# Patient Record
Sex: FEMALE | Race: WHITE
[De-identification: names, ages, dates, MRNs, and addresses within clinical notes are randomized per-mention and may not be internally consistent; named-entity substitution may affect disease eponyms.]

---

## 2017-01-13 ENCOUNTER — HOSPITAL ENCOUNTER (OUTPATIENT)
Dept: HOSPITAL 47 - LABWHC1 | Age: 62
Discharge: HOME | End: 2017-01-13
Payer: MEDICAID

## 2017-01-13 DIAGNOSIS — D64.9: Primary | ICD-10-CM

## 2017-01-13 LAB
ALP SERPL-CCNC: 91 U/L (ref 38–126)
ALT SERPL-CCNC: 30 U/L (ref 9–52)
ANION GAP SERPL CALC-SCNC: 12 MMOL/L
AST SERPL-CCNC: 20 U/L (ref 14–36)
BASOPHILS # BLD AUTO: 0 K/UL (ref 0–0.2)
BASOPHILS NFR BLD AUTO: 0 %
BUN SERPL-SCNC: 14 MG/DL (ref 7–17)
CALCIUM SPEC-MCNC: 9.7 MG/DL (ref 8.4–10.2)
CH: 20.2
CHCM: 28.4
CHLORIDE SERPL-SCNC: 106 MMOL/L (ref 98–107)
CO2 SERPL-SCNC: 26 MMOL/L (ref 22–30)
EOSINOPHIL # BLD AUTO: 0 K/UL (ref 0–0.7)
EOSINOPHIL NFR BLD AUTO: 0 %
ERYTHROCYTE [DISTWIDTH] IN BLOOD BY AUTOMATED COUNT: 3.55 M/UL (ref 3.8–5.4)
ERYTHROCYTE [DISTWIDTH] IN BLOOD: 16.8 % (ref 11.5–15.5)
GLUCOSE SERPL-MCNC: 89 MG/DL (ref 74–99)
HCT VFR BLD AUTO: 25.3 % (ref 34–46)
HDW: 3.98
HGB BLD-MCNC: 7.1 GM/DL (ref 11.4–16)
LUC NFR BLD AUTO: 2 %
LYMPHOCYTES # SPEC AUTO: 1.2 K/UL (ref 1–4.8)
LYMPHOCYTES NFR SPEC AUTO: 18 %
MCH RBC QN AUTO: 20 PG (ref 25–35)
MCHC RBC AUTO-ENTMCNC: 28.1 G/DL (ref 31–37)
MCV RBC AUTO: 71.3 FL (ref 80–100)
MONOCYTES # BLD AUTO: 0.5 K/UL (ref 0–1)
MONOCYTES NFR BLD AUTO: 7 %
NEUTROPHILS # BLD AUTO: 4.6 K/UL (ref 1.3–7.7)
NEUTROPHILS NFR BLD AUTO: 72 %
NON-AFRICAN AMERICAN GFR(MDRD): >60
POTASSIUM SERPL-SCNC: 4.7 MMOL/L (ref 3.5–5.1)
PROT SERPL-MCNC: 7.5 G/DL (ref 6.3–8.2)
SODIUM SERPL-SCNC: 144 MMOL/L (ref 137–145)
TIBC SERPL-MCNC: 439 UG/DL (ref 265–497)
VIT B12 SERPL-MCNC: 887 PG/ML (ref 239–931)
WBC # BLD AUTO: 0.11 10*3/UL
WBC # BLD AUTO: 6.4 K/UL (ref 3.8–10.6)
WBC (PEROX): 6.67

## 2017-01-13 PROCEDURE — 85652 RBC SED RATE AUTOMATED: CPT

## 2017-01-13 PROCEDURE — 84443 ASSAY THYROID STIM HORMONE: CPT

## 2017-01-13 PROCEDURE — 82746 ASSAY OF FOLIC ACID SERUM: CPT

## 2017-01-13 PROCEDURE — 85025 COMPLETE CBC W/AUTO DIFF WBC: CPT

## 2017-01-13 PROCEDURE — 82607 VITAMIN B-12: CPT

## 2017-01-13 PROCEDURE — 80053 COMPREHEN METABOLIC PANEL: CPT

## 2017-01-13 PROCEDURE — 82728 ASSAY OF FERRITIN: CPT

## 2017-01-13 PROCEDURE — 83550 IRON BINDING TEST: CPT

## 2017-01-13 PROCEDURE — 36415 COLL VENOUS BLD VENIPUNCTURE: CPT

## 2017-02-14 NOTE — P.GSHP
History of Present Illness


H&P Date: 02/15/17


Chief Complaint: Anemia





Patient was scheduled for upper and lower endoscopy tomorrow for the diagnosis 

of anemia.  Her last colonoscopy was in 2013.  That was normal with the 

exception of diverticulosis.  To my knowledge is not having any bowel related 

complaints at this time.  No family history of colon cancer.





Past Medical History


Past Medical History: Blood Disorder, GERD/Reflux


Additional Past Medical History / Comment(s): Hx Anemia. Hx of phlebitis LLE in 

1980.Shingles 11/16. Fractured 6th rib and fractured L2-T9.


History of Any Multi-Drug Resistant Organisms: None Reported


Past Surgical History: Appendectomy, Bladder Surgery, Ear Surgery, Hysterectomy


Additional Past Surgical History / Comment(s): Hx colonoscopy, bladder susp. 2 

ear surgeries (anurag).


Past Anesthesia/Blood Transfusion Reactions: Motion Sickness, Postoperative 

Nausea & Vomiting (PONV)


Additional Past Anesthesia/Blood Transfusion Reaction / Comment(s): Prior 

transfusions of her own blood.


Past Psychological History: No Psychological Hx Reported


Smoking Status: Never smoker


Past Alcohol Use History: Rare


Past Drug Use History: None Reported





- Past Family History


  ** Brother(s)


Family Medical History: Cancer


Additional Family Medical History / Comment(s): Hodgkins.





Medications and Allergies


 Home Medications











 Medication  Instructions  Recorded  Confirmed  Type


 


Ascorbic Acid [Vitamin C] 1,000 mg PO DAILY 02/10/17 02/10/17 History


 


Coral Calcium 1 tab PO DAILY 02/10/17 02/10/17 History


 


Ferrous Sulfate [Feosol] 325 mg PO BID 02/10/17 02/10/17 History


 


Ranitidine HCl [Zantac] 150 mg PO HS 02/10/17 02/10/17 History


 


Vitamin B Complex 1 tab PO DAILY 02/10/17 02/10/17 History


 


Vitamin D3 Unk 5,000 mg PO DAILY 02/10/17 02/10/17 History











 Allergies











Allergy/AdvReac Type Severity Reaction Status Date / Time


 


nitrofurantoin Allergy  Rash/Hives Verified 02/10/17 12:48





[From Macrodantin]     


 


Penicillins Allergy  Itching Verified 02/10/17 12:48














Surgical - Exam





Deferred





Assessment and Plan


(1) Anemia


Narrative/Plan: 


Will proceed with upper and lower endoscopy.


Status: Acute

## 2017-02-15 ENCOUNTER — HOSPITAL ENCOUNTER (OUTPATIENT)
Dept: HOSPITAL 47 - ORWHC2ENDO | Age: 62
Discharge: HOME | End: 2017-02-15
Payer: MEDICAID

## 2017-02-15 VITALS — DIASTOLIC BLOOD PRESSURE: 84 MMHG | SYSTOLIC BLOOD PRESSURE: 146 MMHG | HEART RATE: 63 BPM

## 2017-02-15 VITALS — RESPIRATION RATE: 16 BRPM | TEMPERATURE: 98.3 F

## 2017-02-15 VITALS — BODY MASS INDEX: 32.5 KG/M2

## 2017-02-15 DIAGNOSIS — Z88.0: ICD-10-CM

## 2017-02-15 DIAGNOSIS — Z88.1: ICD-10-CM

## 2017-02-15 DIAGNOSIS — K44.9: ICD-10-CM

## 2017-02-15 DIAGNOSIS — D12.5: ICD-10-CM

## 2017-02-15 DIAGNOSIS — Z80.7: ICD-10-CM

## 2017-02-15 DIAGNOSIS — Z79.899: ICD-10-CM

## 2017-02-15 DIAGNOSIS — K22.2: ICD-10-CM

## 2017-02-15 DIAGNOSIS — D12.3: ICD-10-CM

## 2017-02-15 DIAGNOSIS — K29.50: ICD-10-CM

## 2017-02-15 DIAGNOSIS — K20.0: ICD-10-CM

## 2017-02-15 DIAGNOSIS — K29.80: Primary | ICD-10-CM

## 2017-02-15 DIAGNOSIS — K21.0: ICD-10-CM

## 2017-02-15 PROCEDURE — 99153 MOD SED SAME PHYS/QHP EA: CPT

## 2017-02-15 PROCEDURE — 88312 SPECIAL STAINS GROUP 1: CPT

## 2017-02-15 PROCEDURE — 43239 EGD BIOPSY SINGLE/MULTIPLE: CPT

## 2017-02-15 PROCEDURE — 88342 IMHCHEM/IMCYTCHM 1ST ANTB: CPT

## 2017-02-15 PROCEDURE — 45385 COLONOSCOPY W/LESION REMOVAL: CPT

## 2017-02-15 PROCEDURE — 88305 TISSUE EXAM BY PATHOLOGIST: CPT

## 2017-02-15 NOTE — P.HPADDEND
H&P Addendum


H&P Addendum Date: 02/15/17








Physical exam:


General: Well-developed, well-nourished


HEENT: Normocephalic, sclerae nonicteric


Abdomen: Nontender, nondistended


Extremities: No edema


Neuro: Alert and orient





No additional changes to the H&P

## 2017-02-15 NOTE — P.PCN
Date of Procedure: 02/15/17


Procedure(s) Performed: 


PREOPERATIVE DIAGNOSIS: Anemia


POSTOPERATIVE DIAGNOSIS: Duodenitis with erosions, gastritis, moderate hiatal 

hernia, distal esophagitis with Schatzki's ring, transverse and sigmoid colon 

polyps


PROCEDURE: 1.  EGD with biopsy 2.  Colonoscopy with snare polypectomy


ANESTHESIA: MAC


SURGEON: Aubrey Edmondson M.D.


SPECIMENS: Duodenum, antrum, GE junction, polyps


ENDOSCOPIC PROCEDURE: The patient was on the endoscopy table in the left 

decubitus position.  The Olympus gastroscope was inserted into the oropharynx 

and passed under direct visualization to the region of the third portion of the 

duodenum.  From that point the scope was slowly withdrawn inspecting all 

surfaces carefully.  There was duodenitis present with multiple small 

superficial erosions.  Biopsies of the duodenum took place.  The pylorus was 

widely patent.  The stomach revealed mild gastritis.  Retroflexion revealed a 

moderate sized hiatal hernia.  The diaphragmatic hiatus was present at 44 7 m.  

The GE junction was present at 39 cm.  There was some gastritis at the site of 

the hernia is well.  The esophagus was examined.  There was distal esophagitis 

present with a Schatzki's ring noted.  A biopsy of the GE junction took place.  

The remainder the esophagus appear normal. 


     The patient was kept on the endoscopy table in the left decubitus 

position.  The Olympus colonoscope was inserted into the anus and passed under 

direct visualization to the base of the cecum.  The appendiceal orifice was 

visualized.  From that point the scope was slowly withdrawn inspecting all 

surfaces carefully.  There were no neoplastic inflammatory or polypoid lesions 

throughout the cecum and ascending colon.  In the transverse colon there was 

noted to be a small polyp that was removed using the snare with cautery 

technique.  In the sigmoid colon and another small polyp was removed in a 

similar fashion.  The patient's prep was somewhat suboptimal particularly in 

the rectosigmoid region.  I did not visualize any abnormalities however.  There 

was no visible diverticulosis seen.  Digital rectal examination was normal.  

The patient was taken to the recovery room in stable condition per anesthesia 

guidelines.


RECOMMENDATIONS: Biopsy results.  Increase antiacid therapy.  Minimize NSAID 

use.

## 2019-10-11 ENCOUNTER — HOSPITAL ENCOUNTER (OUTPATIENT)
Dept: HOSPITAL 47 - LABPAT | Age: 64
Discharge: HOME | End: 2019-10-11
Attending: INTERNAL MEDICINE
Payer: MEDICARE

## 2019-10-11 DIAGNOSIS — R94.39: ICD-10-CM

## 2019-10-11 DIAGNOSIS — R06.02: ICD-10-CM

## 2019-10-11 DIAGNOSIS — Z01.812: Primary | ICD-10-CM

## 2019-10-11 LAB
ANION GAP SERPL CALC-SCNC: 10 MMOL/L
BUN SERPL-SCNC: 17 MG/DL (ref 7–17)
CHLORIDE SERPL-SCNC: 103 MMOL/L (ref 98–107)
CO2 SERPL-SCNC: 28 MMOL/L (ref 22–30)
ERYTHROCYTE [DISTWIDTH] IN BLOOD BY AUTOMATED COUNT: 4.31 M/UL (ref 3.8–5.4)
ERYTHROCYTE [DISTWIDTH] IN BLOOD: 12.6 % (ref 11.5–15.5)
GLUCOSE SERPL-MCNC: 94 MG/DL (ref 74–99)
HCT VFR BLD AUTO: 41.9 % (ref 34–46)
HGB BLD-MCNC: 14.3 GM/DL (ref 11.4–16)
MCH RBC QN AUTO: 33.3 PG (ref 25–35)
MCHC RBC AUTO-ENTMCNC: 34.2 G/DL (ref 31–37)
MCV RBC AUTO: 97.2 FL (ref 80–100)
PLATELET # BLD AUTO: 304 K/UL (ref 150–450)
POTASSIUM SERPL-SCNC: 4.3 MMOL/L (ref 3.5–5.1)
SODIUM SERPL-SCNC: 141 MMOL/L (ref 137–145)
WBC # BLD AUTO: 8.2 K/UL (ref 3.8–10.6)

## 2019-10-11 PROCEDURE — 84520 ASSAY OF UREA NITROGEN: CPT

## 2019-10-11 PROCEDURE — 82565 ASSAY OF CREATININE: CPT

## 2019-10-11 PROCEDURE — 85027 COMPLETE CBC AUTOMATED: CPT

## 2019-10-11 PROCEDURE — 82947 ASSAY GLUCOSE BLOOD QUANT: CPT

## 2019-10-11 PROCEDURE — 36415 COLL VENOUS BLD VENIPUNCTURE: CPT

## 2019-10-11 PROCEDURE — 80051 ELECTROLYTE PANEL: CPT

## 2019-10-15 ENCOUNTER — HOSPITAL ENCOUNTER (OUTPATIENT)
Dept: HOSPITAL 47 - CATHCVL | Age: 64
End: 2019-10-15
Attending: INTERNAL MEDICINE
Payer: MEDICARE

## 2019-10-15 VITALS — TEMPERATURE: 98.1 F | RESPIRATION RATE: 18 BRPM

## 2019-10-15 VITALS — SYSTOLIC BLOOD PRESSURE: 128 MMHG | HEART RATE: 55 BPM | DIASTOLIC BLOOD PRESSURE: 65 MMHG

## 2019-10-15 VITALS — BODY MASS INDEX: 36.7 KG/M2

## 2019-10-15 DIAGNOSIS — I07.1: ICD-10-CM

## 2019-10-15 DIAGNOSIS — E78.00: ICD-10-CM

## 2019-10-15 DIAGNOSIS — R93.1: ICD-10-CM

## 2019-10-15 DIAGNOSIS — I51.7: Primary | ICD-10-CM

## 2019-10-15 DIAGNOSIS — I77.1: ICD-10-CM

## 2019-10-15 DIAGNOSIS — R94.39: ICD-10-CM

## 2019-10-15 DIAGNOSIS — E78.5: ICD-10-CM

## 2019-10-15 DIAGNOSIS — R07.89: ICD-10-CM

## 2019-10-15 DIAGNOSIS — Z88.1: ICD-10-CM

## 2019-10-15 DIAGNOSIS — Z79.899: ICD-10-CM

## 2019-10-15 PROCEDURE — 93306 TTE W/DOPPLER COMPLETE: CPT

## 2019-10-15 PROCEDURE — 93454 CORONARY ARTERY ANGIO S&I: CPT

## 2019-10-15 RX ADMIN — VERAPAMIL HYDROCHLORIDE ONE ML: 2.5 INJECTION, SOLUTION INTRAVENOUS at 06:25

## 2019-10-15 RX ADMIN — VERAPAMIL HYDROCHLORIDE ONE ML: 2.5 INJECTION, SOLUTION INTRAVENOUS at 06:09

## 2019-10-15 NOTE — CC
CARDIAC CATHETERIZATION REPORT



DATE OF SERVICE:

10/15/2019



PROCEDURE:

Coronary angiography.



PERFORMED BY:

Dr. NORTH Farris.



CLINICAL INFORMATION:

Mrs. Michaela Wilkins is a 64-year-old lady with hypertension, hyperlipidemia,
who

had a recent positive stress test with poor exercise capacity.  She was advised

coronary angiography after due discussion regarding risks, benefits, and 
options.



PROCEDURE NOTE:

Under local anesthesia and strict aseptic precautions, a 6-[place] introducer was
placed

in the right radial artery.  I used a JL3.5 and JR4 catheters, There was extreme

tortuosity as the brachiocephalic artery connected to the ascending aorta.  
However,

with the help of a Glidewire, I was able to get the catheter down and using the 
wire to

help cannulate  both arteries were cannulated nicely.  I did not attempt LV 
pressures

because of extreme tortuosity of the arterial system.

Coronary angiography was performed uneventfully.  The sheath was taken out and 
TR band

applied and patient was sent to the room in stable condition.  Saturation of the

fingers of the right hand was about 98%.  Patient received 3,500 units of 
heparin

intravenously.



CORONARY ANGIOGRAPHY FINDINGS:

RIGHT CORONARY ARTERY: Technically a dominant vessel.  No significant disease. 
Distally

it bifurcates into PDA and PLV, both of which supply a sizable amount of 
myocardium.

There is no significant disease in the dominant RCA or its branches.



LEFT MAIN CORONARY ARTERY: Short patent disease-free vessel that bifurcates into
LAD

and circumflex.



LEFT ANTERIOR DESCENDING CORONARY ARTERY: Good caliber vessel extends along the

anterior wall, gives off septal and diagonal branches runs all the way to the 
apex, has

no significant disease only minor irregularities noted.



LEFT POSTERIOR CIRCUMFLEX CORONARY ARTERY: Technically nondominant vessel gives 
off a

single obtuse marginal that runs laterally in the lateral direction with a 
tortuous

larger branch which continues as a _postero- lateral.  No significant disease in
the

nondominant fair caliber, fair distribution circumflex system.



LV-gram was not performed.  LV pressures were not checked.



FINAL IMPRESSION:

This patient has a right dominant system.  No significant obstructive coronary 
artery

disease.  Filling pressures were not checked.



RECOMMENDATIONS:

Findings were reviewed with the patient.  She has a poor exercise capacity.  We 
will

also check pulmonary function tests and seek full pulmonary evaluation and get 
into a

cardiac rehab type program to improve her conditioning.  The patient will be 
discharged

later on today and will be seen in my office in the next 48 hours.



Moderate conscious sedation time was 26 min. 





MMODL / IJN: [id number] / Job#: [id number]

MTDLORENA

## 2019-10-15 NOTE — ECHOF
Referral Reason:LV Function



MEASUREMENTS

--------

HEIGHT: 160.0 cm

WEIGHT: 112.9 kg

BP: 

RVIDd:   3.4 cm     (< 3.3)

IVSd:   1.3 cm     (0.6 - 1.1)

LVIDd:   4.7 cm     (3.9 - 5.3)

LVPWd:   1.6 cm     (0.6 - 1.1)

IVSs:   1.9 cm

LVIDs:   3.2 cm

LVPWs:   1.8 cm

IVSd:   1.4 cm     (0.6 - 1.1)

LVIDd:   4.4 cm     (3.9 - 5.3)

LVPWd:   1.7 cm     (0.6 - 1.1)

IVSs:   2.4 cm

LVIDs:   2.3 cm

LVPWs:   2.2 cm

EDV(Teich):   89 ml

ESV(Teich):   19 ml

EF(Teich):   79 %

%FS:   48 %

SV(Teich):   70 ml

Ao Diam:   3.8 cm     (2.0 - 3.7)

AV Cusp:   1.7 cm     (1.5 - 2.6)

LA Diam:   3.6 cm     (2.7 - 3.8)

MV E Sergey:   0.60 m/s

MV DecT:   196 ms

MV A Sergey:   0.81 m/s

MV E/A Ratio:   0.74 







FINDINGS

--------

Sinus rhythm.

This was a technically adequate study.

The left ventricular size is normal.   There is moderate concentric left ventricular hypertrophy.   O
verall left ventricular systolic function is normal with, an EF between 55 - 60 %.   Atypical septal 
wall motion

The right ventricle is mildly enlarged.

The left atrial size is normal.

The right atrial size is normal.

Interatrial and interventricular septum intact.

There is no evidence of aortic regurgitation.   There is no evidence of aortic stenosis.

No mitral regurgitation.

Trace tricuspid regurgitation present.   Unable to estimate RVSP due to inadequate TR jet spectral do
ppler profile.

There is no pulmonic regurgitation present.

The aortic root size is normal.

IVC Not well visulized.

There is no pericardial effusion.



CONCLUSIONS

--------

1. Sinus rhythm.

2. This was a technically adequate study.

3. The left ventricular size is normal.

4. There is moderate concentric left ventricular hypertrophy.

5. Overall left ventricular systolic function is normal with, an EF between 55 - 60 %.

6. Atypical septal wall motion

7. The right ventricle is mildly enlarged.

8. The left atrial size is normal.

9. The right atrial size is normal.

10. Interatrial and interventricular septum intact.

11. There is no evidence of aortic regurgitation.

12. There is no evidence of aortic stenosis.

13. No mitral regurgitation.

14. Trace tricuspid regurgitation present.

15. Unable to estimate RVSP due to inadequate TR jet spectral doppler profile.

16. There is no pulmonic regurgitation present.

17. The aortic root size is normal.

18. IVC Not well visulized.

19. There is no pericardial effusion.





SONOGRAPHER: Sigrid eFrmin RDCS

## 2020-03-16 ENCOUNTER — HOSPITAL ENCOUNTER (OUTPATIENT)
Age: 65
Discharge: HOME | End: 2020-03-16
Payer: MEDICARE

## 2020-03-16 DIAGNOSIS — E78.5: Primary | ICD-10-CM

## 2020-03-16 PROCEDURE — 84460 ALANINE AMINO (ALT) (SGPT): CPT

## 2020-03-16 PROCEDURE — 82550 ASSAY OF CK (CPK): CPT

## 2020-03-16 PROCEDURE — 36415 COLL VENOUS BLD VENIPUNCTURE: CPT

## 2020-03-16 PROCEDURE — 84450 TRANSFERASE (AST) (SGOT): CPT

## 2020-03-16 PROCEDURE — 80061 LIPID PANEL: CPT

## 2022-05-26 ENCOUNTER — HOSPITAL ENCOUNTER (EMERGENCY)
Dept: HOSPITAL 47 - EC | Age: 67
LOS: 1 days | Discharge: HOME | End: 2022-05-27
Payer: MEDICARE

## 2022-05-26 DIAGNOSIS — M54.50: Primary | ICD-10-CM

## 2022-05-26 DIAGNOSIS — K21.9: ICD-10-CM

## 2022-05-26 DIAGNOSIS — Z79.899: ICD-10-CM
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
## 2022-05-26 PROCEDURE — 99283 EMERGENCY DEPT VISIT LOW MDM: CPT

## 2022-05-26 PROCEDURE — 96372 THER/PROPH/DIAG INJ SC/IM: CPT

## 2022-05-26 PROCEDURE — 96374 THER/PROPH/DIAG INJ IV PUSH: CPT

## 2022-05-26 NOTE — ED
General Adult HPI





- General


Chief complaint: Back Pain/Injury


Stated complaint: Pain Control


Time Seen by Provider: 05/26/22 21:32


Source: patient


Mode of arrival: wheelchair


Limitations: no limitations





- History of Present Illness


Initial comments: 


Patient is a 66-year-old female presenting with chief complaint of back pain.  

Patient states that at the end of April she sustained several fractures to the 

right arm and compression fractures to the lower back after falling backwards 

down 2 stairs.  Her hospital stay and surgeries were performed in [place].  

Patient is now back in [place] and following with Dr. Henderson for her right arm 

injuries.  Patient states that her pain has been increased recently.  She is 

taking Percocet 7.5 mg at home which have not been helping her pain today.  

Patient states that she has had long-standing prior difficulty controlling her 

bladder, denies any new or worsening loss of bladder control, denies loss of 

bowel control.  Denies saddle paresthesia, denies leg numbness or weakness.  

Denies chest pain, shortness of breath, abdominal pain, nausea, vomiting, 

hematochezia, hematuria, dysuria, urgency, frequency.








- Related Data


                                Home Medications











 Medication  Instructions  Recorded  Confirmed


 


Ascorbic Acid [Vitamin C] 1,000 mg PO DAILY 02/10/17 10/15/19


 


Vitamin B Complex 1 tab PO DAILY 02/10/17 10/15/19


 


Aspirin [Adult Low Dose Aspirin EC] 81 mg PO QAM 10/14/19 10/15/19


 


Cholecalciferol (Vitamin D3) 5,000 unit PO DAILY 10/14/19 10/15/19





[Vitamin D3]   


 


Ferrous Sulfate [Iron (65  mg PO DAILY 10/14/19 10/15/19





Elemental)]   


 


Metoprolol Tartrate [Lopressor] 25 mg PO QAM 10/14/19 10/15/19


 


Rosuvastatin Calcium [Crestor] 5 mg PO HS 10/14/19 10/15/19








                                  Previous Rx's











 Medication  Instructions  Recorded


 


amLODIPine BESYLATE [Norvasc] 5 mg PO HS #30 tablet 10/15/19











                                    Allergies











Allergy/AdvReac Type Severity Reaction Status Date / Time


 


nitrofurantoin Allergy  Rash/Hives Verified 10/15/19 05:17





[From Macrodantin]     


 


Penicillins Allergy  Itching Verified 10/15/19 05:17














Review of Systems


ROS Statement: 


Those systems with pertinent positive or pertinent negative responses have been 

documented in the HPI.





ROS Other: All systems not noted in ROS Statement are negative.





Past Medical History


Past Medical History: Blood Disorder, GERD/Reflux


Additional Past Medical History / Comment(s): Hx Anemia. Hx of phlebitis LLE in 

1980.Shingles 11/16. Fractured 6th rib and fractured L2-T9.


History of Any Multi-Drug Resistant Organisms: None Reported


Past Surgical History: Appendectomy, Bladder Surgery, Ear Surgery, Hysterectomy


Additional Past Surgical History / Comment(s): Hx colonoscopy, bladder susp. 2 

ear surgeries (anurag).


Past Anesthesia/Blood Transfusion Reactions: Motion Sickness, Postoperative 

Nausea & Vomiting (PONV)


Additional Past Anesthesia/Blood Transfusion Reaction / Comment(s): Prior 

transfusions of her own blood.


Past Psychological History: No Psychological Hx Reported


Smoking Status: Never smoker


Past Alcohol Use History: Rare


Past Drug Use History: None Reported





- Past Family History


  ** Brother(s)


Family Medical History: Cancer


Additional Family Medical History / Comment(s): Hodgkins.





General Exam


Limitations: no limitations


General appearance: alert, in no apparent distress


Head exam: Present: atraumatic, normocephalic, normal inspection


Eye exam: Present: normal appearance, EOMI.  Absent: scleral icterus


Neck exam: Present: normal inspection


Respiratory exam: Present: normal lung sounds bilaterally.  Absent: respiratory 

distress, wheezes, rales, rhonchi, stridor


Cardiovascular Exam: Present: regular rate, normal rhythm, normal heart sounds. 

Absent: systolic murmur, diastolic murmur, rubs, gallop, clicks


Extremities exam: Present: normal inspection (Patient is wearing a brace and has

several surgical scars to the right arm, otherwise normal inspection), full ROM 

(Limited range of motion of the right arm due to recent surgery, otherwise full 

range of motion).  Absent: tenderness


Back exam: Present: normal inspection.  Absent: full ROM (Secondary to pain)


Neurological exam: Present: alert, oriented X3, CN II-XII intact


  ** Expanded


Patient oriented to: Present: person, place, time


Speech: Present: fluid speech


Cranial nerves: EOM's Intact: Normal, Facial Sensation: Normal


Eye Response: (4) open spontaneously


Motor Response: (6) obeys commands


Verbal Response: (5) oriented


[place] Total: 15


Psychiatric exam: Present: normal affect, normal mood


Skin exam: Present: warm, dry, intact, normal color.  Absent: rash





Course


                                   Vital Signs











  05/26/22





  17:11


 


Temperature 98.2 F


 


Pulse Rate 74


 


Respiratory 18





Rate 


 


Blood Pressure 136/85


 


O2 Sat by Pulse 98





Oximetry 














Medical Decision Making





- Medical Decision Making


Patient is a 66-year-old female presenting with chief complaint of back pain.  

Patient states that at the end of April she suffered several compression 

fractures after falling backwards down 2 stairs.  Patient has been using her 

pain medication at home, this includes Percocet 7.5 mg, and states that today it

has not been helping her.  On examination patient is clearly in pain, other than

her right arm which is in a brace and has surgical changes, she has full range 

of motion of the remaining 3 limbs.  She denies any new onset loss of bowel or 

bladder control, saddle paresthesia, leg numbness or weakness.  Examination 

shows no focal neurological deficits.  Patient was given pain medication.  On r

eassessment patient is resting comfortably, she appears stable for discharge 

with outpatient follow-up at this time.  I instructed the patient to follow up 

with her PCP and orthopedist as soon as possible.  Report back to ER if any 

worsening symptoms.  I discussed return parameters and alarming symptoms.  

Answered all questions.  Patient conveyed verbal understanding and agreed to the

plan.  I discussed this case with my attending Dr. Thakkar.








Disposition


Clinical Impression: 


 Back pain





Disposition: HOME SELF-CARE


Condition: Good


Instructions (If sedation given, give patient instructions):  Acute Low Back 

Pain (ED)


Additional Instructions: 


Follow-up with your PCP as soon as possible.  Report back to ER if any worsening

symptoms, including but not limited to numbness of the genitals, loss of control

of your bowel or bladder, leg weakness.


Is patient prescribed a controlled substance at d/c from ED?: No


Referrals: 


Rafael Portillo DO [Primary Care Provider] - 1-2 days


Time of Disposition: 23:36

## 2022-05-27 VITALS
SYSTOLIC BLOOD PRESSURE: 143 MMHG | TEMPERATURE: 98.1 F | RESPIRATION RATE: 16 BRPM | HEART RATE: 71 BPM | DIASTOLIC BLOOD PRESSURE: 73 MMHG

## 2022-05-29 ENCOUNTER — HOSPITAL ENCOUNTER (INPATIENT)
Dept: HOSPITAL 47 - EC | Age: 67
LOS: 8 days | Discharge: HOME | DRG: 559 | End: 2022-06-06
Attending: FAMILY MEDICINE | Admitting: FAMILY MEDICINE
Payer: MEDICARE

## 2022-05-29 DIAGNOSIS — S32.058G: ICD-10-CM

## 2022-05-29 DIAGNOSIS — S32.028G: ICD-10-CM

## 2022-05-29 DIAGNOSIS — Z90.49: ICD-10-CM

## 2022-05-29 DIAGNOSIS — S32.010G: Primary | ICD-10-CM

## 2022-05-29 DIAGNOSIS — F41.9: ICD-10-CM

## 2022-05-29 DIAGNOSIS — S32.038G: ICD-10-CM

## 2022-05-29 DIAGNOSIS — S22.060G: ICD-10-CM

## 2022-05-29 DIAGNOSIS — Z87.11: ICD-10-CM

## 2022-05-29 DIAGNOSIS — I26.99: ICD-10-CM

## 2022-05-29 DIAGNOSIS — T46.0X5A: ICD-10-CM

## 2022-05-29 DIAGNOSIS — R32: ICD-10-CM

## 2022-05-29 DIAGNOSIS — Z79.01: ICD-10-CM

## 2022-05-29 DIAGNOSIS — D64.9: ICD-10-CM

## 2022-05-29 DIAGNOSIS — Z86.2: ICD-10-CM

## 2022-05-29 DIAGNOSIS — M85.88: ICD-10-CM

## 2022-05-29 DIAGNOSIS — Z87.81: ICD-10-CM

## 2022-05-29 DIAGNOSIS — R42: ICD-10-CM

## 2022-05-29 DIAGNOSIS — S22.080G: ICD-10-CM

## 2022-05-29 DIAGNOSIS — E66.9: ICD-10-CM

## 2022-05-29 DIAGNOSIS — S32.048G: ICD-10-CM

## 2022-05-29 DIAGNOSIS — Z88.1: ICD-10-CM

## 2022-05-29 DIAGNOSIS — M81.0: ICD-10-CM

## 2022-05-29 DIAGNOSIS — G89.29: ICD-10-CM

## 2022-05-29 DIAGNOSIS — Z81.8: ICD-10-CM

## 2022-05-29 DIAGNOSIS — Z86.19: ICD-10-CM

## 2022-05-29 DIAGNOSIS — Z91.81: ICD-10-CM

## 2022-05-29 DIAGNOSIS — Z79.899: ICD-10-CM

## 2022-05-29 DIAGNOSIS — Z80.7: ICD-10-CM

## 2022-05-29 DIAGNOSIS — K21.9: ICD-10-CM

## 2022-05-29 DIAGNOSIS — S22.070G: ICD-10-CM

## 2022-05-29 DIAGNOSIS — Z90.710: ICD-10-CM

## 2022-05-29 DIAGNOSIS — E78.5: ICD-10-CM

## 2022-05-29 DIAGNOSIS — Z86.72: ICD-10-CM

## 2022-05-29 DIAGNOSIS — M40.204: ICD-10-CM

## 2022-05-29 DIAGNOSIS — F41.0: ICD-10-CM

## 2022-05-29 DIAGNOSIS — Z98.890: ICD-10-CM

## 2022-05-29 DIAGNOSIS — Z79.82: ICD-10-CM

## 2022-05-29 DIAGNOSIS — Z88.0: ICD-10-CM

## 2022-05-29 DIAGNOSIS — M54.59: ICD-10-CM

## 2022-05-29 DIAGNOSIS — Z28.310: ICD-10-CM

## 2022-05-29 LAB
ALBUMIN SERPL-MCNC: 4.1 G/DL (ref 3.5–5)
ALP SERPL-CCNC: 212 U/L (ref 38–126)
ALT SERPL-CCNC: 21 U/L (ref 4–34)
ANION GAP SERPL CALC-SCNC: 9 MMOL/L
AST SERPL-CCNC: 28 U/L (ref 14–36)
BASOPHILS # BLD AUTO: 0 K/UL (ref 0–0.2)
BASOPHILS NFR BLD AUTO: 0 %
BUN SERPL-SCNC: 6 MG/DL (ref 7–17)
CALCIUM SPEC-MCNC: 9.5 MG/DL (ref 8.4–10.2)
CHLORIDE SERPL-SCNC: 103 MMOL/L (ref 98–107)
CO2 SERPL-SCNC: 23 MMOL/L (ref 22–30)
EOSINOPHIL # BLD AUTO: 0.1 K/UL (ref 0–0.7)
EOSINOPHIL NFR BLD AUTO: 1 %
ERYTHROCYTE [DISTWIDTH] IN BLOOD BY AUTOMATED COUNT: 4 M/UL (ref 3.8–5.4)
ERYTHROCYTE [DISTWIDTH] IN BLOOD: 15 % (ref 11.5–15.5)
GLUCOSE SERPL-MCNC: 92 MG/DL (ref 74–99)
HCT VFR BLD AUTO: 39 % (ref 34–46)
HGB BLD-MCNC: 12.3 GM/DL (ref 11.4–16)
LYMPHOCYTES # SPEC AUTO: 1.1 K/UL (ref 1–4.8)
LYMPHOCYTES NFR SPEC AUTO: 14 %
MCH RBC QN AUTO: 30.7 PG (ref 25–35)
MCHC RBC AUTO-ENTMCNC: 31.5 G/DL (ref 31–37)
MCV RBC AUTO: 97.5 FL (ref 80–100)
MONOCYTES # BLD AUTO: 0.6 K/UL (ref 0–1)
MONOCYTES NFR BLD AUTO: 7 %
NEUTROPHILS # BLD AUTO: 6.2 K/UL (ref 1.3–7.7)
NEUTROPHILS NFR BLD AUTO: 76 %
PH UR: 5.5 [PH] (ref 5–8)
PLATELET # BLD AUTO: 377 K/UL (ref 150–450)
POTASSIUM SERPL-SCNC: 3.8 MMOL/L (ref 3.5–5.1)
PROT SERPL-MCNC: 7.7 G/DL (ref 6.3–8.2)
SODIUM SERPL-SCNC: 135 MMOL/L (ref 137–145)
SP GR UR: 1.01 (ref 1–1.03)
SQUAMOUS UR QL AUTO: 3 /HPF (ref 0–4)
UROBILINOGEN UR QL STRIP: <2 MG/DL (ref ?–2)
WBC # BLD AUTO: 8.1 K/UL (ref 3.8–10.6)
WBC # UR AUTO: 4 /HPF (ref 0–5)

## 2022-05-29 PROCEDURE — 85025 COMPLETE CBC W/AUTO DIFF WBC: CPT

## 2022-05-29 PROCEDURE — 72072 X-RAY EXAM THORAC SPINE 3VWS: CPT

## 2022-05-29 PROCEDURE — 80053 COMPREHEN METABOLIC PANEL: CPT

## 2022-05-29 PROCEDURE — 99284 EMERGENCY DEPT VISIT MOD MDM: CPT

## 2022-05-29 PROCEDURE — 86140 C-REACTIVE PROTEIN: CPT

## 2022-05-29 PROCEDURE — 72100 X-RAY EXAM L-S SPINE 2/3 VWS: CPT

## 2022-05-29 PROCEDURE — 93970 EXTREMITY STUDY: CPT

## 2022-05-29 PROCEDURE — 96374 THER/PROPH/DIAG INJ IV PUSH: CPT

## 2022-05-29 PROCEDURE — 80306 DRUG TEST PRSMV INSTRMNT: CPT

## 2022-05-29 PROCEDURE — 71045 X-RAY EXAM CHEST 1 VIEW: CPT

## 2022-05-29 PROCEDURE — 96375 TX/PRO/DX INJ NEW DRUG ADDON: CPT

## 2022-05-29 PROCEDURE — 81001 URINALYSIS AUTO W/SCOPE: CPT

## 2022-05-29 PROCEDURE — 80048 BASIC METABOLIC PNL TOTAL CA: CPT

## 2022-05-29 RX ADMIN — OXYCODONE HYDROCHLORIDE AND ACETAMINOPHEN PRN EACH: 7.5; 325 TABLET ORAL at 23:38

## 2022-05-29 RX ADMIN — MORPHINE SULFATE PRN MG: 2 INJECTION, SOLUTION INTRAMUSCULAR; INTRAVENOUS at 20:57

## 2022-05-29 RX ADMIN — APIXABAN SCH MG: 5 TABLET, FILM COATED ORAL at 19:41

## 2022-05-29 RX ADMIN — CEFAZOLIN STA MLS/HR: 330 INJECTION, POWDER, FOR SOLUTION INTRAMUSCULAR; INTRAVENOUS at 14:00

## 2022-05-29 RX ADMIN — CEFAZOLIN STA MLS/HR: 330 INJECTION, POWDER, FOR SOLUTION INTRAMUSCULAR; INTRAVENOUS at 18:23

## 2022-05-29 RX ADMIN — METOPROLOL TARTRATE SCH MG: 25 TABLET, FILM COATED ORAL at 19:42

## 2022-05-29 NOTE — XR
EXAMINATION TYPE: XR thoracic spine complete

 

DATE OF EXAM: 5/29/2022

 

COMPARISON: 10/9/2015

 

HISTORY: Back pain

 

TECHNIQUE: 3 views

 

FINDINGS: There is thoracic kyphotic deformity with anterior wedging of upper lumbar and lower thorac
ic vertebra up to 50%. There is L1 more severe wedging of 75%. There is osteopenia. There is T11 wedg
ing 50% and T9 wedging 50%. T8 shows 50% compression deformity as well. There is no paraspinal mass. 
There is evidence of infiltrate and atelectasis at both lung bases. There is hiatal hernia.

 

IMPRESSION: Multiple thoracic compression fractures in the lower thoracic spine which are mostly new 
compared to old exam.

## 2022-05-29 NOTE — ED
General Adult HPI





- General


Chief complaint: Back Pain/Injury


Stated complaint: anxiety


Time Seen by Provider: 05/29/22 13:11


Source: patient, RN notes reviewed


Mode of arrival: EMS


Limitations: no limitations





- History of Present Illness


Initial comments: 





Patient is a pleasant 66-year-old female presenting to the emergency department 

with anxiety and pain.  Patient is admitting to feeling very anxious and 

persistently stating help me how may help me.  Patient had a fall over a month 

ago with several arm fractures.  Patient did have 3 surgeries.  Patient also has

history of reported compression fractures of T9 and L2 which were found to be 

chronic.  Patient denies chronic lower back pain.  No new incontinence or 

retention of bowel or bladder.  No leg weakness or loss of sensation.  Patient 

states she was here recently and given medication.  Patient is on Percocet at 

home.  Patient has spoke with Dr. Portillo office as well as Dr. Henderson's office.  

Patient feels she needs to be admitted.





- Related Data


                                Home Medications











 Medication  Instructions  Recorded  Confirmed


 


Ascorbic Acid [Vitamin C] 1,000 mg PO DAILY 02/10/17 10/15/19


 


Vitamin B Complex 1 tab PO DAILY 02/10/17 10/15/19


 


Aspirin [Adult Low Dose Aspirin EC] 81 mg PO QAM 10/14/19 10/15/19


 


Cholecalciferol (Vitamin D3) 5,000 unit PO DAILY 10/14/19 10/15/19





[Vitamin D3]   


 


Ferrous Sulfate [Iron (65  mg PO DAILY 10/14/19 10/15/19





Elemental)]   


 


Metoprolol Tartrate [Lopressor] 25 mg PO QAM 10/14/19 10/15/19


 


Rosuvastatin Calcium [Crestor] 5 mg PO HS 10/14/19 10/15/19








                                  Previous Rx's











 Medication  Instructions  Recorded


 


amLODIPine BESYLATE [Norvasc] 5 mg PO HS #30 tablet 10/15/19











                                    Allergies











Allergy/AdvReac Type Severity Reaction Status Date / Time


 


nitrofurantoin Allergy  Rash/Hives Verified 05/29/22 12:10





[From Macrodantin]     


 


Penicillins Allergy  Itching Verified 05/29/22 12:10














Review of Systems


ROS Statement: 


Those systems with pertinent positive or pertinent negative responses have been 

documented in the HPI.





ROS Other: All systems not noted in ROS Statement are negative.


Constitutional: Denies: fever


Eyes: Denies: eye pain


ENT: Denies: ear pain


Respiratory: Denies: cough


Cardiovascular: Denies: chest pain


Endocrine: Denies: fatigue


Gastrointestinal: Denies: abdominal pain


Genitourinary: Denies: dysuria


Musculoskeletal: Reports: as per HPI, back pain


Skin: Denies: rash


Neurological: Denies: weakness


Psychiatric: Reports: anxiety





Past Medical History


Past Medical History: Blood Disorder, GERD/Reflux


Additional Past Medical History / Comment(s): Hx Anemia. Hx of phlebitis LLE in 

1980.Shingles 11/16. Fractured 6th rib and fractured L2-T9.


History of Any Multi-Drug Resistant Organisms: None Reported


Past Surgical History: Appendectomy, Bladder Surgery, Ear Surgery, Hysterectomy


Additional Past Surgical History / Comment(s): Hx colonoscopy, bladder susp. 2 

ear surgeries (naurag). right shoulder/elbow/wrist surgery post trauma.


Past Anesthesia/Blood Transfusion Reactions: Motion Sickness, Postoperative 

Nausea & Vomiting (PONV)


Additional Past Anesthesia/Blood Transfusion Reaction / Comment(s): Prior 

transfusions of her own blood.


Past Psychological History: No Psychological Hx Reported


Smoking Status: Never smoker


Past Alcohol Use History: Rare


Past Drug Use History: None Reported





- Past Family History


  ** Brother(s)


Family Medical History: Cancer


Additional Family Medical History / Comment(s): Hodgkins.





General Exam


Limitations: no limitations


General appearance: alert, in no apparent distress


Head exam: Present: normocephalic


Eye exam: Present: normal appearance


Neck exam: Present: normal inspection


Respiratory exam: Present: normal lung sounds bilaterally


Cardiovascular Exam: Present: regular rate, normal rhythm


  ** Expanded


Peripheral pulses: 2+: Posterior Tibialis (R), Posterior Tibialis (L), Dorsalis 

Pedis (R), Dorsalis Pedis (L)


GI/Abdominal exam: Present: soft.  Absent: tenderness, pulsatile mass


Extremities exam: Present: other (Incision right upper arm clean and dry and 

intact.  Brace on the right wrist/forearm.)


Back exam: Present: normal inspection.  Absent: tenderness


Neurological exam: Present: alert


Psychiatric exam: Present: anxious


Skin exam: Present: normal color





Course


                                   Vital Signs











  05/29/22





  12:05


 


Temperature 97.9 F


 


Pulse Rate 58 L


 


Respiratory 20





Rate 


 


Blood Pressure 145/74


 


O2 Sat by Pulse 96





Oximetry 














Medical Decision Making





- Medical Decision Making





Patient states she is unable to go home secondary to Rastafarian anxiety and 

relentless back pain.  Case discussed with Dr. Lizarraga, who will admit covering 

for Dr. Portillo.  Consults will be placed with Dr. Ingram as well as psychiatry.





Disposition


Clinical Impression: 


 Back pain, Anxiety





Disposition: ADMITTED AS IP TO THIS HOSP


Is patient prescribed a controlled substance at d/c from ED?: No


Referrals: 


Rafael Portillo DO [Primary Care Provider] - 1-2 days


Time of Disposition: 13:45

## 2022-05-29 NOTE — P.HPIM
History of Present Illness


H&P Date: 05/29/22


Chief Complaint: back pain





Patient is a 66-year-old female with a known history of GERD, anxiety and recent

history of fall while she was on vacation status post right shoulder surgery and

right elbow surgery and forearm fracture on 4/25/2022.  She fell on states and 

also sustained sixth rib fracture.  Patient did history of chronic back pain 

prior to the fall and was told that got worsened with T9 and L2 compression 

fractures.


Hospital course was complicated with  pulmonary embolism and is currently on 

anticoagulation with Eliquis.


Patient came to ER with complaints of pain and patient is also very anxious.  

Complains of lower back pain.  Denies any tingling sensation or numbness in the 

legs.  Patient does wear depends and denies any new complaints of incontinence 

of the bowel or bladder.  No leg weakness.  Denied any headache or dizziness or 

lightheadedness.  No fever no chills.  No cough or sputum production.


No chest pain or shortness breath.


Thoracic spine x-ray showed mild pulm thoracic compression fractures in the 

lower thoracic spine which are mostly new compared to old exam.


Lumbar spine showed multiple osteoporotic type lumbar compression fractures show

significant progression compared to old exam.


Laboratory data showed WBC 8.1 hemoglobin 12.3 and platelets 377


Sodium 135 potassium 3.8 chloride 103 BUN 16 creatinine 0.65 and UDS is positive

for opiates and oxycodone and benzodiazepines.








Review of Systems





Constitutional: Patient denies any fever or chills .  No generalized weakness or

weight loss.  


Abdomen: Patient denied nausea vomiting and diarrhea and abdominal pain.


Cardiovascular: Patient denies any chest pain or short of breath no 

palpitations.


Respiratory: patient denied any cough or sputum production.  No shortness of 

breath


Neurologic: Patient denied any numbness or tingling headache.


Musculoskeletal: Patient denies any complaints of joint swelling or 

deformity.back pain


Skin: Negative


Psychiatric: Negative


Endocrine: No heat or cold intolerance.  No recent weight gain.


Genitourinary: No dysuria or hematuria.


All other 14 point ROS negative except the above





Past Medical History


Past Medical History: Blood Disorder, GERD/Reflux


Additional Past Medical History / Comment(s): Hx Anemia. Hx of phlebitis LLE in 

1980.Shingles 11/16. Fractured 6th rib and fractured L2-T9.


History of Any Multi-Drug Resistant Organisms: None Reported


Past Surgical History: Appendectomy, Bladder Surgery, Ear Surgery, Hysterectomy


Additional Past Surgical History / Comment(s): Hx colonoscopy, bladder susp. 2 

ear surgeries (anurag). right shoulder/elbow/wrist surgery post trauma.


Past Anesthesia/Blood Transfusion Reactions: Motion Sickness, Postoperative 

Nausea & Vomiting (PONV)


Additional Past Anesthesia/Blood Transfusion Reaction / Comment(s): Prior 

transfusions of her own blood.


Past Psychological History: No Psychological Hx Reported


Smoking Status: Never smoker


Past Alcohol Use History: Rare


Past Drug Use History: None Reported





- Past Family History


  ** Brother(s)


Family Medical History: Cancer


Additional Family Medical History / Comment(s): Hodgkins.





Medications and Allergies


                                Home Medications











 Medication  Instructions  Recorded  Confirmed  Type


 


Ferrous Sulfate [Iron (65  mg PO W/BRKFST 10/14/19 05/29/22 History





Elemental)]    


 


Metoprolol Tartrate [Lopressor] 25 mg PO BID 10/14/19 05/29/22 History


 


ALPRAZolam [Xanax] 0.25 mg PO DAILY PRN 05/29/22 05/29/22 History


 


Acetaminophen Tab [Tylenol] 325 - 650 mg PO Q6H PRN 05/29/22 05/29/22 History


 


Apixaban [Eliquis] 5 mg PO BID 05/29/22 05/29/22 History


 


Atorvastatin Calcium [Lipitor] 10 mg PO Q48H 05/29/22 05/29/22 History


 


Buprenorphine [Butrans 5 MCG/HR] 1 patch TRANSDERM TU 05/29/22 05/29/22 History


 


Escitalopram [Lexapro] 20 mg PO DAILY 05/29/22 05/29/22 History


 


busPIRone HCl [Buspar] 10 mg PO TID@0900,1600,2100 05/29/22 05/29/22 History


 


methocarbamoL [Robaxin] 1,000 mg PO TID@0900,1600,2100 05/29/22 05/29/22 History


 


oxyCODONE-APAP 7.5-325MG [Percocet 1 tab PO Q8H PRN 05/29/22 05/29/22 History





7.5-325 mg]    


 


polyethylene glycoL 3350 [Miralax] 17 gm PO DAILY 05/29/22 05/29/22 History








                                    Allergies











Allergy/AdvReac Type Severity Reaction Status Date / Time


 


nitrofurantoin Allergy  Rash/Hives Verified 05/29/22 16:21





[From Macrodantin]     


 


Penicillins Allergy  Itching Verified 05/29/22 16:21














Physical Exam


Vitals: 


                                   Vital Signs











  Temp Pulse Resp BP Pulse Ox


 


 05/29/22 12:05  97.9 F  58 L  20  145/74  96








                                Intake and Output











 05/29/22 05/29/22 05/30/22





 14:59 22:59 06:59


 


Intake Total 200  


 


Balance 200  


 


Intake:   


 


  Oral 200  


 


Other:   


 


  Voiding Method  Toilet 


 


  Weight 113.398 kg 113.398 kg 














PHYSICAL EXAMINATION: 


Patient is lying in the bed comfortably, no acute distress, awake alert and 

oriented.. 


HEENT: Normocephalic. Neck is supple. Pupils reactive. Nostrils clear. Oral 

cavity is moist. 


Neck reveals no JVD, carotid bruits, or thyromegaly. 


CHEST EXAMINATION: Trachea is central. Symmetrical expansion. Lung fields clear 

to auscultation and percussion. 


CARDIAC: Normal S1, S2 with no gallops. No murmurs 


ABDOMEN: Soft. Bowel sounds normal. No organomegaly. No abdominal bruits. 


Extremities: reveal no edema.  No clubbing or cyanosis


Neurologically awake, alert, oriented x3 with well-coordinated movements.  No 

focal deficits noted


Skin: No rash or skin lesions. 


Psychiatric: Cooperative.  Nonsuicidal, anxious


Musculoskeletal: No joint swelling or deformity.  Normal range of motion.





Results


CBC & Chem 7: 


                                 05/29/22 13:28





                                 05/29/22 13:28


Labs: 


                  Abnormal Lab Results - Last 24 Hours (Table)











  05/29/22 05/29/22 Range/Units





  13:28 20:28 


 


Sodium  135 L   (137-145)  mmol/L


 


BUN  6 L   (7-17)  mg/dL


 


Alkaline Phosphatase  212 H   ()  U/L


 


Urine Opiates Screen   Detected H  (NotDetected)  


 


Ur Oxycodone Screen   Detected H  (NotDetected)  


 


U Benzodiazepines Scrn   Detected H  (NotDetected)  














Thrombosis Risk Factor Assmnt





- DVT/VTE Prophylaxis


DVT/VTE Prophylaxis: Pharmacologic Prophylaxis ordered





- Choose All That Apply


Each Factor Represents 1 point: Obesity (BMI >25)


Each Risk Factor Represents 2 Points: Age 61-74 years


Thrombosis Risk Factor Assessment Total Risk Factor Score: 3


Thrombosis Risk Factor Assessment Level: Moderate Risk





Assessment and Plan


Assessment: 








Intractable back pain and multiple compression fractures on x-ray.  Status post 

fall on 4/25/2022.


Recent right shoulder, elbow and forearm surgery


Recent pulmonary embolism on anticoagulation with Eliquis


Anxiety


Hyperlipidemia


GERD


Chronic normocytic anemia


DVT prophylaxis Heparin subcu





Plan:


Patient will be continued on pain management with Dilaudid and oxycodone.  Stool

softeners as needed.


Continue with Eliquis and BuSpar, Lexapro.  Orthopedic surgery will be consulted

due to multiple compression fractures.  Follow-up closely.





Time with Patient: Greater than 30

## 2022-05-29 NOTE — XR
EXAMINATION TYPE: XR lumbar spine 2 or 3V

 

DATE OF EXAM: 5/29/2022

 

COMPARISON: 10/9/2015

 

HISTORY: Back pain

 

TECHNIQUE: 4 views

 

FINDINGS: There is osteopenia. There is L1 compression deformity 80% anterior wedging. There is 50% w
edging of L2 and 20% wedging of L3. There is 36% compression deformity of L4. There is 20% compressio
n of L5. Sacroiliac joints are intact.

 

 

 

IMPRESSION: Multiple osteoporotic type lumbar compression fractures show significant progression comp
ared to old exam.

## 2022-05-30 LAB
ANION GAP SERPL CALC-SCNC: 13.3 MMOL/L (ref 10–18)
BASOPHILS # BLD AUTO: 0.03 X 10*3/UL (ref 0–0.1)
BASOPHILS NFR BLD AUTO: 0.4 %
BUN SERPL-SCNC: 5 MG/DL (ref 9–27)
BUN/CREAT SERPL: 8.33 RATIO (ref 12–20)
CALCIUM SPEC-MCNC: 9.2 MG/DL (ref 8.7–10.3)
CHLORIDE SERPL-SCNC: 104 MMOL/L (ref 96–109)
CO2 SERPL-SCNC: 19.7 MMOL/L (ref 20–27.5)
EOSINOPHIL # BLD AUTO: 0.13 X 10*3/UL (ref 0.04–0.35)
EOSINOPHIL NFR BLD AUTO: 1.9 %
ERYTHROCYTE [DISTWIDTH] IN BLOOD BY AUTOMATED COUNT: 3.57 X 10*6/UL (ref 4.1–5.2)
ERYTHROCYTE [DISTWIDTH] IN BLOOD: 15.8 % (ref 11.5–14.5)
GLUCOSE SERPL-MCNC: 93 MG/DL (ref 70–110)
HCT VFR BLD AUTO: 35.9 % (ref 37.2–46.3)
HGB BLD-MCNC: 10.8 G/DL (ref 12–15)
IMM GRANULOCYTES BLD QL AUTO: 0.6 %
LYMPHOCYTES # SPEC AUTO: 1.63 X 10*3/UL (ref 0.9–5)
LYMPHOCYTES NFR SPEC AUTO: 23.7 %
MCH RBC QN AUTO: 30.3 PG (ref 27–32)
MCHC RBC AUTO-ENTMCNC: 30.1 G/DL (ref 32–37)
MCV RBC AUTO: 100.6 FL (ref 80–97)
MONOCYTES # BLD AUTO: 0.82 X 10*3/UL (ref 0.2–1)
MONOCYTES NFR BLD AUTO: 11.9 %
NEUTROPHILS # BLD AUTO: 4.24 X 10*3/UL (ref 1.8–7.7)
NEUTROPHILS NFR BLD AUTO: 61.5 %
NRBC BLD AUTO-RTO: 0 /100 WBCS (ref 0–0)
PLATELET # BLD AUTO: 320 X 10*3/UL (ref 140–440)
POTASSIUM SERPL-SCNC: 4.4 MMOL/L (ref 3.5–5.5)
SODIUM SERPL-SCNC: 137 MMOL/L (ref 135–145)
WBC # BLD AUTO: 6.89 X 10*3/UL (ref 4.5–10)

## 2022-05-30 RX ADMIN — KETOROLAC TROMETHAMINE SCH MG: 15 INJECTION, SOLUTION INTRAMUSCULAR; INTRAVENOUS at 23:42

## 2022-05-30 RX ADMIN — KETOROLAC TROMETHAMINE SCH MG: 15 INJECTION, SOLUTION INTRAMUSCULAR; INTRAVENOUS at 13:22

## 2022-05-30 RX ADMIN — METOPROLOL TARTRATE SCH MG: 25 TABLET, FILM COATED ORAL at 08:22

## 2022-05-30 RX ADMIN — ESCITALOPRAM OXALATE SCH MG: 20 TABLET, FILM COATED ORAL at 08:23

## 2022-05-30 RX ADMIN — OXYCODONE HYDROCHLORIDE AND ACETAMINOPHEN PRN EACH: 7.5; 325 TABLET ORAL at 20:42

## 2022-05-30 RX ADMIN — APIXABAN SCH MG: 5 TABLET, FILM COATED ORAL at 20:03

## 2022-05-30 RX ADMIN — METOPROLOL TARTRATE SCH MG: 25 TABLET, FILM COATED ORAL at 20:04

## 2022-05-30 RX ADMIN — OXYCODONE HYDROCHLORIDE AND ACETAMINOPHEN PRN EACH: 7.5; 325 TABLET ORAL at 07:16

## 2022-05-30 RX ADMIN — MORPHINE SULFATE PRN MG: 2 INJECTION, SOLUTION INTRAMUSCULAR; INTRAVENOUS at 09:22

## 2022-05-30 RX ADMIN — MORPHINE SULFATE PRN MG: 2 INJECTION, SOLUTION INTRAMUSCULAR; INTRAVENOUS at 00:27

## 2022-05-30 RX ADMIN — APIXABAN SCH MG: 5 TABLET, FILM COATED ORAL at 08:22

## 2022-05-30 RX ADMIN — KETOROLAC TROMETHAMINE SCH MG: 15 INJECTION, SOLUTION INTRAMUSCULAR; INTRAVENOUS at 18:09

## 2022-05-30 RX ADMIN — Medication SCH MG: at 08:22

## 2022-05-30 RX ADMIN — ATORVASTATIN CALCIUM SCH MG: 10 TABLET, FILM COATED ORAL at 20:03

## 2022-05-30 NOTE — P.CNOR
History of Present Illness





- [facility] [place]


Consult date: 05/30/22


Requesting physician: Jaylen Lizarraga


Consult reason: fracture (Multiple compression fracture deformities of the t

horacic and lumbar spines)


History of present illness: 





Patient is a very pleasant 66-year-old female who is seen and examined at 

bedside for further evaluation of her thoracic and lumbar spine with her family 

present.  Patient does have a significant medical history.  Patient states they 

were on vacation in [place], [place] when she sustained a fall on 04 /25/2022.  At that time she sustained multiple fractures to her right shoulder, 

right elbow, and right wrist.  She underwent surgical intervention over the 

course of 3 days for her right shoulder, right elbow, and right wrist.  She 

states during that admission and evaluation she was also diagnosed with 

worsening compression fracture deformities at T9 and L2.  Patient states during 

her admission to the hospital she developed a pulmonary embolism.  She was 

admitted to the hospital for approximately 2 weeks.  She does not remember much 

of her admission to the hospital.  She was discharged to a rehabilitation 

facility and felt she was getting stronger.  She was discharged and returned 

home to [place].  Since that time she feels her thoracic and lumbar pain has 

been worsening.  She states she was not prescribed bracing.  She states she 

sustained an injury while lifting a canoe in August 2015 causing compression 

fractures to T9 and L2 along with a sixth rib fracture.  She states since that 

time she has had some chronic difficulty with pain in her thoracic and lumbar 

spines but states her pain does improve with rest.  Currently she does not feel 

her back pain is controllable.  She denies any lower extremity weakness or 

radiculopathy bilaterally.  She states she does have some chronic difficulty 

with urinary incontinence/leaking following her previous injury in 2015.  





She states also following her recent injury and subsequent surgical 

interventions she is having significant difficulty with anxiety.  She is having 

a hard time remaining calm.  She states she does not know how to manage herself 

currently from a mental standpoint.  Medicine is aware of her anxiety [facility]





Since her return to [place] she has been following with Dr. Tamar Henderson for 

treatment and evaluation of her right shoulder, right elbow, and right wrist 

status post multiple surgical interventions.





She continues to take Eliquis for her pulmonary embolism.





Drain evaluation here at [facility], x-ray imaging of her 

thoracic spine and lumbar spine were performed.  Imaging showed multiple 

compression fracture deformities of her thoracic and lumbar spines.  We did 

discuss I would like to obtain further imaging as to the chronicity of these 

multiple fractures but the patient states she does not feel she is currently 

tolerating further imaging.  She would be willing to consider bracing.  We did 

discuss with her plan for bracing at her thoracolumbar spine.





Patient states she was previously diagnosed with osteoporosis.  She is planning 

to discuss further treatment options for osteoporosis with her primary care 

provider, Dr. Portillo.





Patient states she also has a history of stomach ulcer with anemia after long-

term ibuprofen use.





Past Medical History


Past Medical History: Blood Disorder, GERD/Reflux


Additional Past Medical History / Comment(s): Hx Anemia. Hx of phlebitis LLE in 

1980.Shingles 11/16. Fractured 6th rib and fractured L2-T9.


History of Any Multi-Drug Resistant Organisms: None Reported


Past Surgical History: Appendectomy, Bladder Surgery, Ear Surgery, Hysterectomy


Additional Past Surgical History / Comment(s): Hx colonoscopy, bladder susp. 2 

ear surgeries (anurag). right shoulder/elbow/wrist surgery post trauma.


Past Anesthesia/Blood Transfusion Reactions: Motion Sickness, Postoperative 

Nausea & Vomiting (PONV)


Additional Past Anesthesia/Blood Transfusion Reaction / Comm: Prior transfusions

of her own blood.


Past Psychological History: No Psychological Hx Reported


Smoking Status: Never smoker


Past Alcohol Use History: Rare


Past Drug Use History: None Reported





- Past Family History


  ** Brother(s)


Family Medical History: Cancer


Additional Family Medical History / Comment(s): Hodgkins.





Medications and Allergies


                                Home Medications











 Medication  Instructions  Recorded  Confirmed  Type


 


Ferrous Sulfate [Iron (65  mg PO W/BRKFST 10/14/19 05/29/22 History





Elemental)]    


 


Metoprolol Tartrate [Lopressor] 25 mg PO BID 10/14/19 05/29/22 History


 


ALPRAZolam [Xanax] 0.25 mg PO DAILY PRN 05/29/22 05/29/22 History


 


Acetaminophen Tab [Tylenol] 325 - 650 mg PO Q6H PRN 05/29/22 05/29/22 History


 


Apixaban [Eliquis] 5 mg PO BID 05/29/22 05/29/22 History


 


Atorvastatin Calcium [Lipitor] 10 mg PO Q48H 05/29/22 05/29/22 History


 


Buprenorphine [Butrans 5 MCG/HR] 1 patch TRANSDERM TU 05/29/22 05/29/22 History


 


Escitalopram [Lexapro] 20 mg PO DAILY 05/29/22 05/29/22 History


 


busPIRone HCl [Buspar] 10 mg PO TID@0900,1600,2100 05/29/22 05/29/22 History


 


methocarbamoL [Robaxin] 1,000 mg PO TID@0900,1600,2100 05/29/22 05/29/22 History


 


oxyCODONE-APAP 7.5-325MG [Percocet 1 tab PO Q8H PRN 05/29/22 05/29/22 History





7.5-325 mg]    


 


polyethylene glycoL 3350 [Miralax] 17 gm PO DAILY 05/29/22 05/29/22 History








                                    Allergies











Allergy/AdvReac Type Severity Reaction Status Date / Time


 


nitrofurantoin Allergy  Rash/Hives Verified 05/29/22 16:21





[From Macrodantin]     


 


Penicillins Allergy  Itching Verified 05/29/22 16:21














Physical Examination








Physical exam:





Patient is awake, alert, and oriented 3


Vital signs stable


Good chest excursion with deep inspiration and expiration


Examination of thoracic and lumbar spine reveals skin is intact with no 

abrasions, lacerations, or bruises; no erythema, purulence or signs of infection


Pain with palpation along the midline of the lower thoracic spine and lower 

lumbar spine


Some increased kyphosis at the thoracic spine


Dorsiflexion, plantarflexion, and extensor hallucis longus positive sustained 

bilaterally


Lower extremity strength 5/5 bilaterally


Straight leg test negative bilateral lower extremities


No signs or symptoms of DVT; no calf pain


No pain with internal and external rotation of the hips bilaterally


Neurovascularly intact





Results





Pertinent studies:


X-rays of the thoracic spine and lumbar spine taken on 05/29/2022: Evidence of a

significant number of compression fractures at the thoracic and lumbar spine of 

indeterminate age; T8 slight wedging compression fracture deformity; T9 wedge 

compression fracture deformity with approximately 50% height loss T11 wedge 

compression fracture deformity of approximately 75% height loss; increased 

kyphosis of the thoracic spine; L1 wedging compression fracture deformity at 

approximately 80% height loss; L2 superior endplate compression fracture 

deformity of approximately 50% height loss; L3 superior endplate compression 

fracture deformity at approximately 30% height loss; L4 superior endplate 

compression fracture deformity with approximately 40% height loss; L5 superior 

endplate compression fracture deformity approximately 30% height loss; bones 

appear osteopenic





- Labs


Labs: 


                  Abnormal Lab Results - Last 24 Hours (Table)











  05/29/22 05/29/22 05/29/22 Range/Units





  13:28 20:28 23:20 


 


RBC     (4.10-5.20)  X 10*6/uL


 


Hgb     (12.0-15.0)  g/dL


 


Hct     (37.2-46.3)  %


 


MCV     (80.0-97.0)  fL


 


MCHC     (32.0-37.0)  g/dL


 


RDW     (11.5-14.5)  %


 


Sodium  135 L    (137-145)  mmol/L


 


Carbon Dioxide     (20.0-27.5)  mmol/L


 


BUN  6 L    (7-17)  mg/dL


 


BUN/Creatinine Ratio     (12.00-20.00)  Ratio


 


Alkaline Phosphatase  212 H    ()  U/L


 


C-Reactive Protein     (0.00-0.80)  mg/dL


 


Ur Leukocyte Esterase    Trace H  (Negative)  


 


Urine Bacteria    Rare H  (None)  /hpf


 


Urine Mucus    Rare H  (None)  /hpf


 


Urine Opiates Screen   Detected H   (NotDetected)  


 


Ur Oxycodone Screen   Detected H   (NotDetected)  


 


U Benzodiazepines Scrn   Detected H   (NotDetected)  














  05/30/22 05/30/22 Range/Units





  05:43 05:43 


 


RBC  3.57 L   (4.10-5.20)  X 10*6/uL


 


Hgb  10.8 L   (12.0-15.0)  g/dL


 


Hct  35.9 L   (37.2-46.3)  %


 


MCV  100.6 H   (80.0-97.0)  fL


 


MCHC  30.1 L   (32.0-37.0)  g/dL


 


RDW  15.8 H   (11.5-14.5)  %


 


Sodium    (137-145)  mmol/L


 


Carbon Dioxide   19.7 L  (20.0-27.5)  mmol/L


 


BUN   5.0 L  (7-17)  mg/dL


 


BUN/Creatinine Ratio   8.33 L  (12.00-20.00)  Ratio


 


Alkaline Phosphatase    ()  U/L


 


C-Reactive Protein   1.20 H  (0.00-0.80)  mg/dL


 


Ur Leukocyte Esterase    (Negative)  


 


Urine Bacteria    (None)  /hpf


 


Urine Mucus    (None)  /hpf


 


Urine Opiates Screen    (NotDetected)  


 


Ur Oxycodone Screen    (NotDetected)  


 


U Benzodiazepines Scrn    (NotDetected)  








                                      H & H











  05/29/22 05/30/22 Range/Units





  13:28 05:43 


 


Hgb  12.3  10.8 L  (11.4-16.0)  gm/dL


 


Hct  39.0  35.9 L  (34.0-46.0)  %











Result Diagrams: 


                                 05/30/22 05:43





                                 05/30/22 05:43





Assessment and Plan


Assessment: 





Assessment:





Acute on chronic thoracic and lumbar pain


Multiple compression fracture deformities of the thoracic spine and lumbar spine

of indeterminate age


Compression fracture deformities of T8, T9, T11, L1, L2, L3, L4, and L5


Pulmonary embolism currently on Eliquis


Status post fall on 04/25/2022 in [place], [place] resulted in multiple 

right upper extremity fractures requiring surgical intervention


Anxiety


Osteoporosis


Obesity


History of stomach ulcer


History of anemia


History of injury in August 2015 resulting in compression fractures of T9 and L2

and sixth rib fracture


Chronic difficulty with urinary incontinence/leaking following injury in 2015


(1) Multiple fractures of thoracic spine


Current Visit: Yes   Status: Acute   Code(s): S22.009A - UNSP FRACTURE OF UNSP 

THORACIC VERTEBRA, INIT FOR CLOS FX   SNOMED Code(s): 105889300


   





(2) Lumbar compression fracture


Current Visit: Yes   Status: Acute   Code(s): S32.000A - WEDGE COMPRESSION 

FRACTURE OF UNSP LUMBAR VERTEBRA, INIT   SNOMED Code(s): 586820167


   





(3) Osteoporosis


Current Visit: Yes   Status: Acute   Code(s): M81.0 - AGE-RELATED OSTEOPOROSIS 

W/O CURRENT PATHOLOGICAL FRACTURE   SNOMED Code(s): 79984898


   





(4) Thoracic back pain


Current Visit: Yes   Status: Acute   Code(s): M54.6 - PAIN IN THORACIC SPINE   

SNOMED Code(s): 510765745


   





(5) Lumbar pain


Current Visit: Yes   Status: Acute   Code(s): M54.50 - LOW BACK PAIN, 

UNSPECIFIED   SNOMED Code(s): 081585219


   





(6) Status post fall


Current Visit: Yes   Status: Acute   Code(s): Z91.81 - HISTORY OF FALLING   

SNOMED Code(s): 344910722


   





(7) Obesity (BMI 30-39.9)


Current Visit: Yes   Status: Acute   Code(s): E66.9 - OBESITY, UNSPECIFIED   

SNOMED Code(s): 044005310


   





(8) Pulmonary embolism


Current Visit: Yes   Status: Acute   Code(s): I26.99 - OTHER PULMONARY EMBOLISM 

WITHOUT ACUTE COR PULMONALE   SNOMED Code(s): 61537677


   





(9) History of gastric ulcer


Current Visit: Yes   Status: Acute   Code(s): Z87.11 - PERSONAL HISTORY OF 

PEPTIC ULCER DISEASE   SNOMED Code(s): 280949146


   





(10) History of anemia


Current Visit: Yes   Status: Acute   Code(s): Z86.2 - PRSNL HISTORY OF DIS OF 

THE BLD/BLD-FORM ORG/IMMUN MECHNSM   SNOMED Code(s): 657476304


   





(11) Anxiety


Current Visit: Yes   Status: Acute   Code(s): F41.9 - ANXIETY DISORDER, 

UNSPECIFIED   SNOMED Code(s): 04484095


   


Plan: 





Plan:





1.  Patient is known have osteoporosis.  She initially sustained an injury in 

2015 resulting in fractures of T9 and L2.  She recovered from those fractures 

over the course of 12 weeks and was able to return to work and regular 

activities of daily living.  She recently sustained an injury when falling while

in [place], [place] on 04/25/2022.  At that time she sustained multiple 

fractures to her right shoulder, elbow, and wrist which all required surgical 

intervention.  She states she was also told at that time she had worsening 

compression fractures at T9 and L2.  She was not prescribed bracing at that 

time.  Her back pain had been improving while she was still in [place] but has

worsened since she returned to [place].  Reviewing of x-ray imaging of her 

thoracic spine and lumbar spine taken at [facility] shows 

evidence of multiple compression fracture deformities of indeterminate age at 

T8, T9, T11, L1, L2, L3, L4, and L5.  She denies any lower extremity weakness or

radiculopathy bilaterally.  Patient is also currently on Elquis for recent 

diagnosis of pulmonary embolism during her admission in [place], [place]. 

After reviewing of imaging, physical examination the patient, and further 

discussion with the patient, will currently plan to continue with conservative 

treatment at this time. At this time we'll plan for bracing.  A prescription has

been written and will be provided to case management for a Spinomed TLSO brace. 

Once this brace is delivered and fitted appropriately, patient should wear this 

brace while sitting upright at greater than 45, during increase activities, 

during ambulation.  Brace is not have to or while lying in bed or while bathing.

 This brace will most likely be unable to be obtained today, [place] Day.  Britt vargas has been ambulating and performing activities of daily living over the past 

month without a brace intact.  She is not having any neurological change in her 

lower extremities.  We did discuss she should avoid any excessive activities 

including avoiding bending, twisting, and lifting activities until this brace is

delivered and fitted appropriately.  She may transfer from her bed to the 

bathroom.  She should avoid working through formal physical therapy activities 

until her brace is delivered and fitted appropriately.  We will continue to 

follow the patient closely.





We did discuss in detail I would like to obtain further imaging with either MRI 

imaging of her thoracic and lumbar spine or bone scan imaging for further 

evaluation into the chronicity of her multiple fractures.  Patient admits to 

significant difficulty with anxiety and has had significant difficulty remaining

calm.  She does not feel she could currently tolerate lying in a machine for 

further imaging and requests that we do not order further imaging at this time. 

Anxiety has been discussed with medicine is planning to prescribe Ativan.  We 

did discuss if the patient is able to have her anxiety under better control we 

may plan to obtain further imaging at that time.





Once the patient is discharged from the hospital, she may follow-up with Trenton Chavez PA-C or Dr. Gama Ingram at [facility].





She will continue to follow with Dr. Tamar Henderson for further treatment and 

evaluation of her right upper extremity status post multiple surgical 

interventions for fractures at her right shoulder, right elbow, and right wrist.





2.  Patient will continue be seen and examined by medicine


Time with Patient: Greater than 30 (Including obtaining history, physical 

examination, reviewing of imaging, and dictation.)

## 2022-05-30 NOTE — PN
PROGRESS NOTE



DATE OF SERVICE:

05/30/2022



This 66-year-old woman who was admitted with severe back pain also had multiple

compression fractures.  The patient also recently had right shoulder surgery as well as

right forearm surgery after falling while in [place].  The patient is complaining of

severe anxiety also. The patient was given Ativan earlier, with some relief. Past

medical history reviewed.



REVIEW OF SYSTEMS:

Fourteen-point review of systems negative except as mentioned earlier.



CURRENT MEDICATIONS:

Current medications are reviewed and include Lexapro. The rest of the medications and

doses are reviewed.



PHYSICAL EXAMINATION:

Pulse is 50, blood pressure 138/80, respiration 18.

HEENT: Conjunctivae normal.

NECK: No jugular venous distention.

CARDIOVASCULAR: S1, S2 muffled.

RESPIRATION: Breath sounds diminished at the bases.  No rhonchi.

ABDOMEN: Soft.

EXAMINATION OF THE RIGHT SHOULDER: Status post surgery.

NERVOUS SYSTEM: No focal deficit.



LABS:

Hemoglobin 10.8.  Other labs are noted.



ASSESSMENT:

1. Acute severe back pain, intractable, with multiple compression fractures.

2. History of recent right shoulder surgery and elbow and forearm surgery.

3. Recent pulmonary embolism, on anticoagulation with Eliquis.

4. Anxiety, severe.

5. Hyperlipidemia.

6. Gastroesophageal reflux disease.

7. Chronic anemia.



RECOMMENDATIONS AND DISCUSSION:

I recommend to continue current medications, continue with the monitoring, symptomatic

treatment.  I recommend pain medications on a regular basis.  Add Toradol to the

current regimen.  Orthopedic evaluation.  Otherwise, back brace.  Symptomatic

treatment.  Ativan for anxiety.  Orthopedics would like to get an MRI. Once the anxiety

is stabilized I would recommend MRI, probable with Ativan 1 mg 30 minutes prior to the

procedure to alleviate the anxiety. Overall prognosis guarded.  Discussed at length

with the patient.  See orders for further details.  Further recommendations to follow.





MMODL / IJN: [id number] / Job#: [id number]

## 2022-05-31 RX ADMIN — OXYCODONE HYDROCHLORIDE AND ACETAMINOPHEN PRN EACH: 7.5; 325 TABLET ORAL at 08:20

## 2022-05-31 RX ADMIN — HYDROMORPHONE HYDROCHLORIDE PRN MG: 1 INJECTION, SOLUTION INTRAMUSCULAR; INTRAVENOUS; SUBCUTANEOUS at 22:51

## 2022-05-31 RX ADMIN — KETOROLAC TROMETHAMINE SCH MG: 15 INJECTION, SOLUTION INTRAMUSCULAR; INTRAVENOUS at 17:12

## 2022-05-31 RX ADMIN — METOPROLOL TARTRATE SCH MG: 25 TABLET, FILM COATED ORAL at 21:23

## 2022-05-31 RX ADMIN — Medication SCH MG: at 08:16

## 2022-05-31 RX ADMIN — METOPROLOL TARTRATE SCH MG: 25 TABLET, FILM COATED ORAL at 08:16

## 2022-05-31 RX ADMIN — KETOROLAC TROMETHAMINE SCH MG: 15 INJECTION, SOLUTION INTRAMUSCULAR; INTRAVENOUS at 05:41

## 2022-05-31 RX ADMIN — KETOROLAC TROMETHAMINE SCH MG: 15 INJECTION, SOLUTION INTRAMUSCULAR; INTRAVENOUS at 12:03

## 2022-05-31 RX ADMIN — ESCITALOPRAM OXALATE SCH MG: 20 TABLET, FILM COATED ORAL at 08:16

## 2022-05-31 RX ADMIN — APIXABAN SCH MG: 5 TABLET, FILM COATED ORAL at 08:16

## 2022-05-31 RX ADMIN — APIXABAN SCH MG: 5 TABLET, FILM COATED ORAL at 21:23

## 2022-05-31 NOTE — P.EN
Patient will require a hospital bed on discharge given extensive severe 

intractable lower back pain with multiple compression fractures noted on x-ray 

she requires positioning of the body that is not feasible with an ordinary bed 

and the head of the bed needs to be elevated more than 30 most of the time in 

order to alleviate the pain and/or pressure from her compression fractures.

## 2022-05-31 NOTE — PN
PROGRESS NOTE



DATE OF SERVICE:

05/31/2022



This 66-year-old woman who was admitted with acute severe low back pain was recommended

MRI scan which could not be done and the patient is also for a bone scan.  The patient

refused because the patient has significant anxiety and reports that the patient is

unable to lie down.  No chest pain. No palpitation.



PHYSICAL EXAMINATION:

Pulse 71, blood pressure 115/60, respiration 20. HEENT: Oral mucosa moist. Neck:  No

JVD. Cardiovascular: S1, S2 muffled. Respiration: Breath sounds diminished in the

bases.  Abdomen:  Soft. Nervous system: No focal deficits.



LABS:

Hemoglobin 10.8.  Other labs are noted.  C reactive protein 1.20.



ASSESSMENT:

1. Acute severe back pain, intractable with multiple compression fractures.

2. History of recent right shoulder surgery and elbow and forearm surgery.

3. Recent pulmonary embolism on anticoagulation with Eliquis.

4. Anxiety, severe.

5. Hyperlipidemia.

6. Gastroesophageal reflux disease.

7. Chronic anemia.



RECOMMENDATIONS AND DISCUSSION:

Recommend to continue current medications, management and symptomatic treatment.

Otherwise the pain management.  Closely follow with Orthopedic surgery. Bone scan.

Prognosis guarded.  Discussed with the patient and family at length.  The patient might

require PT/OT and possible rehab also depending upon the patient's clinical

progression.  Further recommendations to follow.





MMODL / IJN: [id number] / Job#: [id number]

## 2022-05-31 NOTE — P.PN
Progress Note - Text


Progress Note Date: 05/31/22





Orthopedic spine:





History of present illness:





Patient is very pleasant 66-year-old female who is seen and examined bedside for

follow-up evaluation for her thoracic spine and lumbar spine with her family 

present.  She has been seen and examined by medicine and has been started on 

Ativan.  She also received Dilaudid for pain control this morning.  Since being 

seen and examined yesterday, her anxiety is much better controlled.  She is a 

little to sleep this morning but is answering questions appropriately.  She 

states she does continue to experience ongoing thoracic and lumbar pain.  She 

continues to deny any lower extremity weakness or radiculopathy bilaterally.  

During her evaluation here at [facility], x-ray imaging of her 

thoracic spine and lumbar spine were performed.  Imaging showed multiple 

compression fracture deformities of her thoracic and lumbar spines.  We did 

discuss I would like to obtain further imaging as to the chronicity of these 

multiple fractures.  She states today she is unable to undergo MRI imaging and 

she previously had her stapes removed in her ears bilaterally and a piston 

implant has been placed.  She states she would be willing to undergo bone scan 

testing.





Patient history:


Patient does have a significant medical history.  Patient states they were on 

vacation in [place], [place] when she sustained a fall on 04/25/2022.  At 

that time she sustained multiple fractures to her right shoulder, right elbow, 

and right wrist.  She underwent surgical intervention over the course of 3 days 

for her right shoulder, right elbow, and right wrist.  She states during that 

admission and evaluation she was also diagnosed with worsening compression 

fracture deformities at T9 and L2.  Patient states during her admission to the 

hospital she developed a pulmonary embolism.  She was admitted to the hospital 

for approximately 2 weeks.  She does not remember much of her admission to the 

hospital.  She was discharged to a rehabilitation facility and felt she was 

getting stronger.  She was discharged and returned home to [place].  Since that

time she feels her thoracic and lumbar pain has been worsening.  She states she 

was not prescribed bracing.  She states she sustained an injury while lifting a 

canoe in August 2015 causing compression fractures to T9 and L2 along with a 

sixth rib fracture.  She states since that time she has had some chronic 

difficulty with pain in her thoracic and lumbar spines but states her pain does 

improve with rest.  She states she does have some chronic difficulty with 

urinary incontinence/leaking following her previous injury in 2015.  





Since her return to [place] she has been following with Dr. Tamar Henderson for 

treatment and evaluation of her right shoulder, right elbow, and right wrist 

status post multiple surgical interventions.





She continues to take Eliquis for her pulmonary embolism.





Patient states she was previously diagnosed with osteoporosis.  She is planning 

to discuss further treatment options for osteoporosis with her primary care 

provider, Dr. Portillo.





Patient states she also has a history of stomach ulcer with anemia after long-

term ibuprofen use.








Physical exam:





Patient is awake, alert, and oriented 3


Vital signs stable


Good chest excursion with deep inspiration and expiration


Examination of thoracic and lumbar spine reveals skin is intact with no 

abrasions, lacerations, or bruises; no erythema, purulence or signs of infection


Pain with palpation along the midline of the lower thoracic spine and lower 

lumbar spine


Some increased kyphosis at the thoracic spine


Dorsiflexion, plantarflexion, and extensor hallucis longus positive sustained 

bilaterally


Lower extremity strength 5/5 bilaterally


Straight leg test negative bilateral lower extremities


No signs or symptoms of DVT; no calf pain


No pain with internal and external rotation of the hips bilaterally


Neurovascularly intact








Pertinent studies:


X-rays of the thoracic spine and lumbar spine taken on 05/29/2022: Evidence of a

significant number of compression fractures at the thoracic and lumbar spine of 

indeterminate age; T8 slight wedging compression fracture deformity; T9 wedge 

compression fracture deformity with approximately 50% height loss T11 wedge 

compression fracture deformity of approximately 75% height loss; increased 

kyphosis of the thoracic spine; L1 wedging compression fracture deformity at 

approximately 80% height loss; L2 superior endplate compression fracture 

deformity of approximately 50% height loss; L3 superior endplate compression 

fracture deformity at approximately 30% height loss; L4 superior endplate 

compression fracture deformity with approximately 40% height loss; L5 superior 

endplate compression fracture deformity approximately 30% height loss; bones 

appear osteopenic








Assessment:





Acute on chronic thoracic and lumbar pain


Multiple compression fracture deformities of the thoracic spine and lumbar spine

of indeterminate age


Compression fracture deformities of T8, T9, T11, L1, L2, L3, L4, and L5


Pulmonary embolism currently on Eliquis


Status post fall on 04/25/2022 in [place], [place] resulted in multiple 

right upper extremity fractures requiring surgical intervention


Anxiety


Osteoporosis


Obesity


History of stomach ulcer


History of anemia


History of injury in August 2015 resulting in compression fractures of T9 and L2

and sixth rib fracture


Chronic difficulty with urinary incontinence/leaking following injury in 2015








Plan:





1.  Patient is known have osteoporosis.  She initially sustained an injury in 

2015 resulting in fractures of T9 and L2.  She recovered from those fractures 

over the course of 12 weeks and was able to return to work and regular 

activities of daily living.  She recently sustained an injury when falling while

in [place], [place] on 04/25/2022.  At that time she sustained multiple 

fractures to her right shoulder, elbow, and wrist which all required surgical 

intervention.  She states she was also told at that time she had worsening 

compression fractures at T9 and L2.  She was not prescribed bracing at that 

time.  Her back pain had been improving while she was still in [place] but has

worsened since she returned to [place].  Reviewing of x-ray imaging of her 

thoracic spine and lumbar spine taken at [facility] shows 

evidence of multiple compression fracture deformities of indeterminate age at 

T8, T9, T11, L1, L2, L3, L4, and L5.  She denies any lower extremity weakness or

radiculopathy bilaterally.  Patient is also currently on Elquis for recent 

diagnosis of pulmonary embolism during her admission in [place], [place]. 

After reviewing of imaging, physical examination the patient, and further 

discussion with the patient, will currently plan to continue with conservative 

treatment at this time. At this time we'll plan for bracing.  A prescription has

been written and signed yesterday.  It will be provided to case management today

for a Spinomed TLSO brace.  Once this brace is delivered and fitted 

appropriately, patient should wear this brace while sitting upright at greater 

than 45, during increase activities, during ambulation.  Brace does not have to

or while lying in bed or while bathing.  Hopefully this brace will be able to be

obtained today.  Patient has been ambulating and performing activities of daily 

living over the past month without a brace intact.  She is not having any 

neurological change in her lower extremities.  We did discuss she should avoid 

any excessive activities including avoiding bending, twisting, and lifting 

activities until this brace is delivered and fitted appropriately.  She may 

transfer from her bed to the bathroom.  She should avoid working through formal 

physical therapy activities until her brace is delivered and fitted 

appropriately.  We will continue to follow the patient closely.





Patient was also having significant difficulty with anxiety yesterday.  This has

improved since starting Ativan.  We again did discuss in detail we would like to

obtain further imaging regards to her thoracic and lumbar spines to evaluate the

chronicity of her fractures.  She states she see had her stapes removed in the 

ears bilaterally and has had pistons placed.  She is unable to undergo MRI 

imaging due to the piston placement.  We discussed we'll plan to obtain a 

nuclear medicine whole-body bone scan for further evaluation.  Patient is 

agreeable to this.  We will plan to order the nuclear medicine whole-body bone 

scan and we'll review the imaging once complete.





Once the patient is discharged from the hospital, she may follow-up with Trenton Chavez PA-C or Dr. Gama Ingram at [facility].





She will continue to follow with Dr. Tamar Henderson for further treatment and 

evaluation of her right upper extremity status post multiple surgical 

interventions for fractures at her right shoulder, right elbow, and right wrist.





2.  Patient will continue be seen and examined by medicine

## 2022-06-01 LAB
GLUCOSE BLD-MCNC: 111 MG/DL (ref 75–99)
GLUCOSE BLD-MCNC: 160 MG/DL (ref 75–99)

## 2022-06-01 RX ADMIN — Medication SCH MG: at 08:36

## 2022-06-01 RX ADMIN — ATORVASTATIN CALCIUM SCH MG: 10 TABLET, FILM COATED ORAL at 20:36

## 2022-06-01 RX ADMIN — HYDROMORPHONE HYDROCHLORIDE PRN MG: 1 INJECTION, SOLUTION INTRAMUSCULAR; INTRAVENOUS; SUBCUTANEOUS at 15:08

## 2022-06-01 RX ADMIN — OXYCODONE HYDROCHLORIDE AND ACETAMINOPHEN PRN EACH: 7.5; 325 TABLET ORAL at 00:28

## 2022-06-01 RX ADMIN — METHYLPREDNISOLONE SODIUM SUCCINATE SCH MG: 40 INJECTION, POWDER, FOR SOLUTION INTRAMUSCULAR; INTRAVENOUS at 15:07

## 2022-06-01 RX ADMIN — INSULIN ASPART SCH: 100 INJECTION, SOLUTION INTRAVENOUS; SUBCUTANEOUS at 17:29

## 2022-06-01 RX ADMIN — APIXABAN SCH MG: 5 TABLET, FILM COATED ORAL at 08:36

## 2022-06-01 RX ADMIN — OXYCODONE HYDROCHLORIDE AND ACETAMINOPHEN PRN EACH: 7.5; 325 TABLET ORAL at 07:33

## 2022-06-01 RX ADMIN — KETOROLAC TROMETHAMINE SCH MG: 15 INJECTION, SOLUTION INTRAMUSCULAR; INTRAVENOUS at 00:28

## 2022-06-01 RX ADMIN — METOPROLOL TARTRATE SCH MG: 25 TABLET, FILM COATED ORAL at 20:36

## 2022-06-01 RX ADMIN — ESCITALOPRAM OXALATE SCH MG: 20 TABLET, FILM COATED ORAL at 08:35

## 2022-06-01 RX ADMIN — KETOROLAC TROMETHAMINE SCH MG: 15 INJECTION, SOLUTION INTRAMUSCULAR; INTRAVENOUS at 05:19

## 2022-06-01 RX ADMIN — APIXABAN SCH MG: 5 TABLET, FILM COATED ORAL at 20:37

## 2022-06-01 RX ADMIN — OXYCODONE HYDROCHLORIDE AND ACETAMINOPHEN PRN EACH: 7.5; 325 TABLET ORAL at 20:35

## 2022-06-01 RX ADMIN — KETOROLAC TROMETHAMINE SCH MG: 15 INJECTION, SOLUTION INTRAMUSCULAR; INTRAVENOUS at 17:43

## 2022-06-01 RX ADMIN — KETOROLAC TROMETHAMINE SCH MG: 15 INJECTION, SOLUTION INTRAMUSCULAR; INTRAVENOUS at 12:55

## 2022-06-01 RX ADMIN — OXYCODONE HYDROCHLORIDE AND ACETAMINOPHEN PRN EACH: 7.5; 325 TABLET ORAL at 12:55

## 2022-06-01 RX ADMIN — INSULIN ASPART SCH UNIT: 100 INJECTION, SOLUTION INTRAVENOUS; SUBCUTANEOUS at 21:33

## 2022-06-01 RX ADMIN — METOPROLOL TARTRATE SCH MG: 25 TABLET, FILM COATED ORAL at 08:35

## 2022-06-01 NOTE — PN
PROGRESS NOTE



DATE OF SERVICE:

06/01/2022



This 66-year-old woman who was admitted with significant back pain had multiple

fractures.  Patient had significant panic attacks also and received intravenous 
Ativan.

The patient is being closely monitored  Pain Management.  No chest pain.  No

palpitations.  No fever.



PHYSICAL EXAMINATION:

Pulse is 63, blood pressure 120/70, respiration 18.

CHEST:  Clear to auscultation.

CARDIOVASCULAR: S1, S2 muffled.

ABDOMEN: Soft.

NERVOUS SYSTEM: No focal deficit

EXAMINATION OF THE BACK: Tenderness present.



LABS:

Reviewed.



ASSESSMENT:

1. Acute severe back pain, intractable, with multiple compression fractures.

2. History of recent right shoulder surgery and elbow and right forearm surgery.

3. Recent pulmonary embolism, anticoagulated with Eliquis.

4. Anxiety, severe, and panic attacks.

5. Hyperlipidemia.

6. Gastroesophageal reflux disease.

7. Chronic anemia.



RECOMMENDATIONS AND DISCUSSION:

I recommend to continue current medications, continue with the monitoring, 
symptomatic

treatment.  Continue with the pain management.  Otherwise, I would also 
recommend a

psychiatric consultation.  Will add a small dose of IV steroids. Monitor blood 
sugars

closely.  Guarded prognosis.  Further recommendations to follow. I would also 
recommend

a chest x-ray and ultrasound of the legs to complete the workup.





MMODL / IJN: [id number] / Job#: [id number]

MTDD

## 2022-06-01 NOTE — US
EXAMINATION TYPE: US venous doppler duplex LE 

 

DATE OF EXAM: 6/1/2022 4:24 PM

 

COMPARISON: NONE

 

CLINICAL HISTORY: dvt. Pain. R/O DVT. Hx PE. Patient is on eliquis.

 

SIDE PERFORMED: Bilateral  

 

TECHNIQUE:  The lower extremity deep venous system is examined utilizing real time linear array sonog
josé with graded compression, doppler sonography and color-flow sonography.

 

VESSELS IMAGED:

Common Femoral Vein

Deep Femoral Vein

Greater Saphenous Vein *

Femoral Vein

Popliteal Vein

Small Saphenous Vein *

Proximal Calf Veins

(* superficial vessels)

 

 

 

Right Leg:  No evidence of DVT in veins imaged at this time.

 

Left Leg:  No evidence of DVT in veins imaged at this time.

 

*Exam is limited due to body habitus.

 

IMPRESSION:  

 

1. Bilateral lower extremity ultrasound negative for deep venous thrombosis.

## 2022-06-01 NOTE — XR
EXAMINATION TYPE: XR chest 1V portable

 

DATE OF EXAM: 6/1/2022

 

COMPARISON: 2/11/2020

 

HISTORY: Shortness of breath

 

TECHNIQUE: Single frontal view of the chest is obtained.

 

FINDINGS:  Limited inspiration with postsurgical change involving the right shoulder. No pneumothorax
. Heart size is enlarged and there is bilateral infiltrate and small effusion. No overt failure.

 

IMPRESSION:  Bilateral lower lobe infiltrate or atelectasis correlate clinically.

## 2022-06-01 NOTE — P.PN
Progress Note - Text


Progress Note Date: 06/01/22





Orthopedic spine:





History of present illness:





Patient is very pleasant 66-year-old female who is seen and examined bedside for

follow-up evaluation for her thoracic spine and lumbar spine with her family 

present.  She is having some difficulty with anxiety given this morning.  She 

has been started on Ativan by medicine.  She continues to be prescribed multiple

medications for pain control including Dilaudid and Percocet 7.5 mg/325 mg.  She

is currently sitting comfortably in bed answering questions appropriately.  

Previously prescribed Spinomed TLSO brace was delivered and fitted appropriately

yesterday.  She has worn this brace while ambulating.  She is not wearing this 

brace while lying sitting in bed





She states she does continue to experience ongoing thoracic and lumbar pain.  

She continues to deny any lower extremity weakness or radiculopathy bilaterally.

 During her evaluation here at [facility], x-ray imaging of her

thoracic spine and lumbar spine were performed.  Imaging showed multiple 

compression fracture deformities of her thoracic and lumbar spines.  We did 

discuss I would like to obtain further imaging as to the chronicity of these 

multiple fractures.  Yesterday the patient states she was willing to have a 

nuclear medicine whole-body bone scan performed.  I ordered this testing but 

when the testing was set to be performed patient declined the testing.  She 

states today she could not tolerate further testing with further imaging due to 

her anxiety and states she would not feel comfortable lying in a tube for 

further imaging.  She states at the bedside she does not wish for further 

imaging during her admission to the hospital in regards to her thoracic or 

lumbar spines.








Patient history:


Patient does have a significant medical history.  Patient states they were on 

vacation in [place], [place] when she sustained a fall on 04/25/2022.  At 

that time she sustained multiple fractures to her right shoulder, right elbow, 

and right wrist.  She underwent surgical intervention over the course of 3 days 

for her right shoulder, right elbow, and right wrist.  She states during that 

admission and evaluation she was also diagnosed with worsening compression 

fracture deformities at T9 and L2.  Patient states during her admission to the 

hospital she developed a pulmonary embolism.  She was admitted to the hospital 

for approximately 2 weeks.  She does not remember much of her admission to the 

hospital.  She was discharged to a rehabilitation facility and felt she was 

getting stronger.  She was discharged and returned home to [place].  Since that

time she feels her thoracic and lumbar pain has been worsening.  She states she 

was not prescribed bracing.  She states she sustained an injury while lifting a 

canoe in August 2015 causing compression fractures to T9 and L2 along with a 

sixth rib fracture.  She states since that time she has had some chronic 

difficulty with pain in her thoracic and lumbar spines but states her pain does 

improve with rest.  She states she does have some chronic difficulty with 

urinary incontinence/leaking following her previous injury in 2015.  





Since her return to [place] she has been following with Dr. Tamar Henderson for 

treatment and evaluation of her right shoulder, right elbow, and right wrist 

status post multiple surgical interventions.  She'll plan to follow up with her 

in the outpatient setting.





She continues to take Eliquis for her pulmonary embolism.





Patient states she was previously diagnosed with osteoporosis.  She is planning 

to discuss further treatment options for osteoporosis with her primary care 

provider, Dr. Portillo.





Patient states she also has a history of stomach ulcer with anemia after long-

term ibuprofen use.








Physical exam:





Patient is awake, alert, and oriented 3


Vital signs stable


Good chest excursion with deep inspiration and expiration


Examination of thoracic and lumbar spine reveals skin is intact with no 

abrasions, lacerations, or bruises; no erythema, purulence or signs of infection


Pain with palpation along the midline of the lower thoracic spine and lower 

lumbar spine


Some increased kyphosis at the thoracic spine


Dorsiflexion, plantarflexion, and extensor hallucis longus positive sustained 

bilaterally


Lower extremity strength 5/5 bilaterally


Straight leg test negative bilateral lower extremities


No signs or symptoms of DVT; no calf pain


No pain with internal and external rotation of the hips bilaterally


Neurovascularly intact








Pertinent studies:


X-rays of the thoracic spine and lumbar spine taken on 05/29/2022: Evidence of a

significant number of compression fractures at the thoracic and lumbar spine of 

indeterminate age; T8 slight wedging compression fracture deformity; T9 wedge 

compression fracture deformity with approximately 50% height loss T11 wedge 

compression fracture deformity of approximately 75% height loss; increased 

kyphosis of the thoracic spine; L1 wedging compression fracture deformity at 

approximately 80% height loss; L2 superior endplate compression fracture 

deformity of approximately 50% height loss; L3 superior endplate compression 

fracture deformity at approximately 30% height loss; L4 superior endplate 

compression fracture deformity with approximately 40% height loss; L5 superior 

endplate compression fracture deformity approximately 30% height loss; bones 

appear osteopenic








Assessment:





Acute on chronic thoracic and lumbar pain


Multiple compression fracture deformities of the thoracic spine and lumbar spine

of indeterminate age


Compression fracture deformities of T8, T9, T11, L1, L2, L3, L4, and L5


Pulmonary embolism currently on Eliquis


Status post fall on 04/25/2022 in [place], [place] resulted in multiple 

right upper extremity fractures requiring surgical intervention


Anxiety


Osteoporosis


Obesity


History of stomach ulcer


History of anemia


History of injury in August 2015 resulting in compression fractures of T9 and L2

and sixth rib fracture


Chronic difficulty with urinary incontinence/leaking following injury in 2015








Plan:





1.  Patient is known have osteoporosis.  She initially sustained an injury in 

2015 resulting in fractures of T9 and L2.  She recovered from those fractures 

over the course of 12 weeks and was able to return to work and regular 

activities of daily living.  She recently sustained an injury when falling while

in [place], [place] on 04/25/2022.  At that time she sustained multiple 

fractures to her right shoulder, elbow, and wrist which all required surgical 

intervention.  She states she was also told at that time she had worsening 

compression fractures at T9 and L2.  She was not prescribed bracing at that 

time.  Her back pain had been improving while she was still in [place] but has

worsened since she returned to [place].  Reviewing of x-ray imaging of her 

thoracic spine and lumbar spine taken at [facility] shows 

evidence of multiple compression fracture deformities of indeterminate age at 

T8, T9, T11, L1, L2, L3, L4, and L5.  She denies any lower extremity weakness or

radiculopathy bilaterally.  Patient is also currently on Elquis for recent diag

nosis of pulmonary embolism during her admission in [place], [place]. 

After reviewing of imaging, physical examination the patient, and further 

discussion with the patient, will currently plan to continue with conservative 

treatment at this time. At this time we'll plan for bracing.  A prescription has

been written and signed and provided to case management yesterday.  This brace 

has been delivered and fitted appropriately.  Patient should continue to wear 

the Spinomed TLSO brace while sitting upright at greater than 45, during 

increase activities, during ambulation.  Brace does not have to or while lying 

in bed or while bathing.  We did discuss she should avoid any excessive 

activities including avoiding bending, twisting, and lifting activities until 

this brace is delivered and fitted appropriately.  She may transfer from her bed

to the bathroom.  She may work with formal physical therapy with her brace 

intact.





Since being seen and examined yesterday, nuclear medicine whole-body bone scan 

was ordered as the patient was willing to proceed forward with this test but the

patient declined having this imaging performed at the time the test was 

scheduled to be performed.  Patient states at the bedside due to anxiety and 

feeling she would be too uncomfortable to lie in a tube for further imaging she 

declines further imaging be obtained during her admission to the hospital.  She 

does not wish to have a bone scan performed during her admission to the 

hospital.  At this time, patient will be clear for discharge from an orthopedic 

spine standpoint.  Patient may follow-up with Trenton Chavez PA-C or Dr. Gama Ingram at [facility] approximately 2-3 weeks for further

evaluation.





She will continue to follow with Dr. Tamar Henderson for further treatment and 

evaluation of her right upper extremity status post multiple surgical 

interventions for fractures at her right shoulder, right elbow, and right wrist.





2.  Patient will continue be seen and examined by medicine











I Have again reviewed the patient and the imaging.  The patient has declined 

further testing care facility.  I think that her spine is essentially stable and

she is not having any specific neurologic deficit in her symptoms seem to be 

somewhat better controlled with bracing.  I think it is okay for her to be 

discharged home when she is clear is medicine service to follow up with us in 

regards to her spine and also with Dr. Henderson for her upper extremity issues.

## 2022-06-02 LAB
GLUCOSE BLD-MCNC: 123 MG/DL (ref 75–99)
GLUCOSE BLD-MCNC: 123 MG/DL (ref 75–99)
GLUCOSE BLD-MCNC: 126 MG/DL (ref 75–99)
GLUCOSE BLD-MCNC: 130 MG/DL (ref 75–99)

## 2022-06-02 RX ADMIN — INSULIN ASPART SCH: 100 INJECTION, SOLUTION INTRAVENOUS; SUBCUTANEOUS at 20:53

## 2022-06-02 RX ADMIN — HYDROMORPHONE HYDROCHLORIDE PRN MG: 1 INJECTION, SOLUTION INTRAMUSCULAR; INTRAVENOUS; SUBCUTANEOUS at 18:24

## 2022-06-02 RX ADMIN — METOPROLOL TARTRATE SCH MG: 25 TABLET, FILM COATED ORAL at 08:42

## 2022-06-02 RX ADMIN — KETOROLAC TROMETHAMINE SCH MG: 15 INJECTION, SOLUTION INTRAMUSCULAR; INTRAVENOUS at 12:37

## 2022-06-02 RX ADMIN — METHYLPREDNISOLONE SODIUM SUCCINATE SCH MG: 40 INJECTION, POWDER, FOR SOLUTION INTRAMUSCULAR; INTRAVENOUS at 08:44

## 2022-06-02 RX ADMIN — KETOROLAC TROMETHAMINE SCH MG: 15 INJECTION, SOLUTION INTRAMUSCULAR; INTRAVENOUS at 00:39

## 2022-06-02 RX ADMIN — HYDROMORPHONE HYDROCHLORIDE PRN MG: 1 INJECTION, SOLUTION INTRAMUSCULAR; INTRAVENOUS; SUBCUTANEOUS at 22:10

## 2022-06-02 RX ADMIN — Medication SCH MG: at 22:12

## 2022-06-02 RX ADMIN — APIXABAN SCH MG: 5 TABLET, FILM COATED ORAL at 08:42

## 2022-06-02 RX ADMIN — ESCITALOPRAM OXALATE SCH MG: 20 TABLET, FILM COATED ORAL at 08:41

## 2022-06-02 RX ADMIN — OXYCODONE HYDROCHLORIDE AND ACETAMINOPHEN PRN EACH: 7.5; 325 TABLET ORAL at 23:38

## 2022-06-02 RX ADMIN — METHYLPREDNISOLONE SODIUM SUCCINATE SCH MG: 40 INJECTION, POWDER, FOR SOLUTION INTRAMUSCULAR; INTRAVENOUS at 15:33

## 2022-06-02 RX ADMIN — INSULIN ASPART SCH: 100 INJECTION, SOLUTION INTRAVENOUS; SUBCUTANEOUS at 12:35

## 2022-06-02 RX ADMIN — METOPROLOL TARTRATE SCH MG: 25 TABLET, FILM COATED ORAL at 22:13

## 2022-06-02 RX ADMIN — Medication SCH MG: at 08:42

## 2022-06-02 RX ADMIN — METHYLPREDNISOLONE SODIUM SUCCINATE SCH MG: 40 INJECTION, POWDER, FOR SOLUTION INTRAMUSCULAR; INTRAVENOUS at 23:40

## 2022-06-02 RX ADMIN — KETOROLAC TROMETHAMINE SCH MG: 15 INJECTION, SOLUTION INTRAMUSCULAR; INTRAVENOUS at 05:51

## 2022-06-02 RX ADMIN — OXYCODONE HYDROCHLORIDE AND ACETAMINOPHEN PRN EACH: 7.5; 325 TABLET ORAL at 15:31

## 2022-06-02 RX ADMIN — METHYLPREDNISOLONE SODIUM SUCCINATE SCH MG: 40 INJECTION, POWDER, FOR SOLUTION INTRAMUSCULAR; INTRAVENOUS at 00:39

## 2022-06-02 RX ADMIN — INSULIN ASPART SCH: 100 INJECTION, SOLUTION INTRAVENOUS; SUBCUTANEOUS at 17:27

## 2022-06-02 RX ADMIN — APIXABAN SCH MG: 5 TABLET, FILM COATED ORAL at 22:12

## 2022-06-02 RX ADMIN — OXYCODONE HYDROCHLORIDE AND ACETAMINOPHEN PRN EACH: 7.5; 325 TABLET ORAL at 08:42

## 2022-06-02 RX ADMIN — INSULIN ASPART SCH: 100 INJECTION, SOLUTION INTRAVENOUS; SUBCUTANEOUS at 08:44

## 2022-06-02 NOTE — P.PN
Progress Note - Text


Progress Note Date: 06/02/22





The patient is seen and examined at bedside.  She is sitting up in bed since her

back is comfortable.  She says she has been able to walk with and without her 

brace.  She is extremely anxious this morning.  She is having a great deal of 

difficulty controlling her anxiety.  She is not exactly sure why.





She is breathing comfortably on room air.  


She is following commands.  


The incision in her shoulder looks clean and dry.  The wrist splint is intact.  


She is nontender at her back.  She has neurologic status intact at her lower 

extremities.











Assessment and plan





Multiple thoracolumbar compression fractures of variable density being treated 

conservatively with bracing.


Recent fall


Multiple injuries a right upper extremity status post procedures had outside 

institution approximately 5 weeks out which appear to be healing appropriately


Severe anxiety





The patient's orthopedic issues appear overall relatively stable.  We will plan 

to continue conservative treatment for her thoracic and lumbar fractures with 

bracing whenever she is out of bed.  She does not need to use the brace while 

she is in bed.  She may remove the brace for bathing.


For her right upper extremity her shoulder appears to be healing appropriate.  

She should continue her wrist brace.  She is scheduled for follow-up with hand 

specialty as outpatient and I think that is appropriate.





The patient's primary issue seems to be her anxiety.  She is being treated by 

medicine and I think that is appropriate.  She is having great to do copiously 

coping with her issues and this is being addressed medically.  From an 

orthopedic standpoint she is clear for discharge home with outpatient follow-up 

when her anxiety appropriatly managed and cleared with medicine.

## 2022-06-02 NOTE — P.PN
Subjective


Progress Note Date: 06/02/22











This is a 66-year-old female who was recently admitted with significant back 

pain and multiple fractures also experiencing significant panic attacks which is

a possible component of uncontrolled pain.  Patient is alert and oriented 3 and

denies any suicidal ideation or thoughts of wanting to harm herself or others.  

Psychiatry has been consulted and appreciate input and recommendations.  Patient

is currently maintained on IV pain medication along with oral and has Ativan as 

needed.  Patient denies chest pain or shortness of breath.  Patient is afebrile.

 Patient did receive her LSO brace and reports to walking out into the hallway 

with it.   at the bedside and questions and concerns were answered.





Review of systems:


Constitutional:  reports of fatigue and extreme anxiety with frequent panic 

attacks, no reports of fever, or chills


Cardiovascular: No reports of chest pain or palpitations


Respiratory: No reports of shortness of breath or cough


GI: No reports of nausea, no reports of of vomiting 


: No reports of dysuria or retention


Neurovascular:  reports of generalized weakness, reports lower back and right 

shoulder pain





All medications have been reviewed





Active Medications





Apixaban (Apixaban 5 Mg Tab)  5 mg PO BID [facility]; Protocol


   Last Admin: 06/02/22 08:42 Dose:  5 mg


   


Atorvastatin Calcium (Atorvastatin 10 Mg Tab)  10 mg PO Q48H [facility]


   Last Admin: 06/01/22 20:36 Dose:  10 mg


   


Buspirone HCl (Buspirone Hcl 10 Mg Tab)  20 mg PO TID [facility]


Duloxetine HCl (Duloxetine Hcl 30 Mg Capsule.Dr)  30 mg PO BID [facility]


Ferrous Sulfate (Ferrous Sulfate 325 Mg Tab)  325 mg PO W/BRKFST [facility]


   Last Admin: 06/02/22 08:42 Dose:  325 mg


   


Hydromorphone HCl (Hydromorphone 0.5 Mg/0.5 Ml Syringe)  0.5 mg IVP Q3H PRN


   PRN Reason: Severe Pain


   Last Admin: 06/01/22 15:08 Dose:  0.5 mg


   


Hydroxyzine Pamoate (Hydroxyzine Pamoate 25 Mg Cap)  50 mg PO Q8HR PRN


   PRN Reason: Anxiety


Insulin Aspart (Insulin Aspart (Novolog) 100 Unit/Ml Vial)  0 unit SQ ACHS [facility]; 

Protocol


   Last Admin: 06/02/22 12:35 Dose:  Not Given


   


Lorazepam (Lorazepam 2 Mg/Ml Inj)  1 mg IV Q6HR PRN


   PRN Reason: Anxiety


   Last Admin: 06/02/22 08:30 Dose:  1 mg


   


Lorazepam (Lorazepam 0.5 Mg Tab)  0.5 mg PO Q6HR PRN


   PRN Reason: Anxiety


   Last Admin: 06/01/22 19:59 Dose:  0.5 mg


   


Melatonin (Melatonin 3 Mg Tablet)  6 mg PO HS [facility]


Methocarbamol (Methocarbamol 500 Mg Tab)  1,000 mg PO TID@0900,1600,2100 [facility]


   Last Admin: 06/02/22 08:41 Dose:  1,000 mg


   


Methylprednisolone Sodium Succinate (Methylprednisolone Sod Succi 40 Mg/Ml 1 Ml 

Vial)  40 mg IV Q8HR [facility]


   Last Admin: 06/02/22 08:44 Dose:  40 mg


   


Metoprolol Tartrate (Metoprolol Tartrate 25 Mg Tab)  25 mg PO BID [facility]


   Last Admin: 06/02/22 08:42 Dose:  25 mg


   


Buprenorphine [ Butrans 5 Mcg/Hr] Patch  1 patch TRANSDERM TU [facility]


   Last Admin: 05/31/22 08:17 Dose:  Not Given


   


Oxycodone/Acetaminophen (Oxycodone-Apap 7.5-325mg 1 Each Tab)  1 each PO Q6HR 

PRN


   PRN Reason: Moderate Pain


   Last Admin: 06/02/22 08:42 Dose:  1 each


   


Polyethylene Glycol (Polyethylene Glycol 3350 17 Gm Powd.Pack)  17 gm PO DAILY 

[facility]


   Last Admin: 06/02/22 08:42 Dose:  17 gm


   





PHYSICAL EXAMINATION: 





GENERAL: The patient is alert and oriented x4,  Well developed, well nourished. 

Anxious


HEENT: Pupils are round and equally reacting to light. EOMI. no scleral icterus.

No conjunctival pallor. Normocephalic, atraumatic. No pharyngeal erythema. No 

thyromegaly.  


CARDIOVASCULAR: S1 and S2  muffled 


PULMONARY: diminished breath sounds bilaterally with no wheezing or rhonchi 

noted. 


ABDOMEN: soft.  Nontender on exam.  obese. non-distended, normoactive bowel 

sounds. No palpable organomegaly. 


MUSCULOSKELETAL: No joint swelling or deformity.


EXTREMITIES: No cyanosis, clubbing, or pedal edema.  


NEUROLOGICAL: Gross neurological examination did not reveal any focal deficits. 

Diffuse weakness


SKIN: No rashes.  Right shoulder surgical site is dry and intact with some Brandon

ri-Strips noted





Assessment:





Acute severe back pain, intractable, with multiple compression fractures


History of recent right shoulder surgery and elbow and right forearm surgery


Recent pulmonary embolism, anticoagulated with Eliquis


Anxiety, severe, and panic attacks


Hyperlipidemia


Gastroesophageal reflux disease


Chronic anemia


GI prophylaxis


DVT prophylaxis


Full code





Plan:





Recommend to continue with current medications and management of pain.  

Orthopedics Dr. Ingram has evaluated the patient recommending conservative 

management at this time and LSO brace has been delivered and patient reports to 

using and able to get up with assistance of her  minimally to the 

hallway.  Patient continues with extreme and severe anxiety and panic attacks 

and psychiatry has evaluated the patient recommending medication adjustments.  

Will continue to monitor closely and evaluate for improvement and panic attacks,

anxiety, and pain management.  Recommend continue monitoring Accu-Cheks before 

meals and at bedtime and continue with sliding scale as needed as patient is on 

IV steroids.  Patient will complete a steroid taper on discharge.  Will also add

incentive spirometer and encourage the patient use at least 10 times every hour 

while awake.  Due to multiple complex medical issues, prognosis is guarded.








The impression and plan of care has been dictated by Marine Cardenas, nurse 

practitioner as directed.





MD Margie


I have performed a history and examination and MDM of this patient, discussed 

the same with the dictator, and  agree with the dictator's assessment and plan 

as written ,documented as a scribe. Based on total visit time,  I have performed

more than 50% of the visit. Any additional findings or plans will be noted. 





Objective





- Vital Signs


Vital signs: 


                                   Vital Signs











Temp  97.9 F   06/02/22 07:33


 


Pulse  56 L  06/02/22 08:00


 


Resp  18   06/02/22 08:00


 


BP  151/81   06/02/22 07:33


 


Pulse Ox  93 L  06/02/22 07:33


 


FiO2      








                                 Intake & Output











 06/01/22 06/02/22 06/02/22





 18:59 06:59 18:59


 


Intake Total 931  336


 


Balance 931  336


 


Intake:   


 


  Oral 931  336


 


Other:   


 


  Voiding Method  Bedside Commode Bedside Commode


 


  # Voids 4 1 


 


  # Bowel Movements 1  














- Labs


CBC & Chem 7: 


                                 05/30/22 05:43





                                 05/30/22 05:43


Labs: 


                  Abnormal Lab Results - Last 24 Hours (Table)











  06/01/22 06/01/22 06/02/22 Range/Units





  17:11 21:22 07:09 


 


POC Glucose (mg/dL)  111 H  160 H  123 H  (75-99)  mg/dL














  06/02/22 Range/Units





  11:51 


 


POC Glucose (mg/dL)  123 H  (75-99)  mg/dL

## 2022-06-02 NOTE — P.CN
Psychiatric Consult





- .


Consult date: 06/02/22


Consult:: 





06/02/22 12:46


IDENTIFYING DATA: This patient is a 66-year-old  female currently 

 lives with her , has 3 kids, lives in a house, collects social 

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





REASON FOR REFERRAL: Psychiatry was consulted for anxiety, panic attacks, 

chronic pain.





HISTORY OF PRESENT ILLNESS: The patient presented to the hospital on 5/29 she 

came into the hospital complaining of anxiety and pain.  Patient stated that she

had a fall approximately one month prior and several arm fractures and has had 3

surgeries for repair.  Patient was admitted medically and psychiatrically 

psychiatry was consulted for anxiety.  Patient later states the patient has been

anxious at times and has been having panic attacks and also religiously 

preoccupied.  Patient was seen in the room today with her  who is agreea

ble to speak to writer outside.  He states that since patient's fall in April in

[place], patient was in the hospital and has become more anxious and worrying 

more.  Writer spoke with patient in the room today who appears to be anxious, 

irritable at times.  She states that she has been feeling "miserable" and spoke 

significantly about her pain.  She states that anxiety has been increasing for 

the past month or so.  She states that since the fall and hospitalization.  She 

states that she underwent several medical complications in the hospital and it 

was a "rough time".  She states that she is worried about the use of her arm in 

the future if she can use it or not.  She also states that she does not like 

taking a lot of medications.  She states that she was in the hospital for about 

2 weeks and then went to rehab.  She states that afterwards they drove back to 

[place] back home and states that the long car ride made her back even worse.  

She states that she gets panic attacks about 2-3 times a day.  She states that 

at this time only Ativan has been helping.  She claims that she does have racing

thoughts however denies any paranoia.  She states that she sleeps about 2-3 

hours a night.  Claims that her appetite has been fair. At this time patient 

denies any suicidal or homical ideations, intent or plan. Patient denies any 

auditory, visual hallucinations and denies any paranoia or delusions. Patients 

admits to using no recreational drugs or cigarettes.





PAST PSYCHIATRIC HISTORY: Patient has a a history of anxiety and panic attacks. 

Patient is currently on Lexapro and BuSpar.  She also claims that she takes 

Xanax when necessary.  Patient denies any previous psychiatric hospitalizations.

Patient denies any psychiatric outpatient follow-up. Patient denies any history 

of suicide attempts in the past.





Past Medical History: Blood Disorder, GERD/Reflux


Additional Past Medical History / Comment(s): Hx Anemia. Hx of phlebitis LLE in 

1980.Shingles 11/16. Fractured 6th rib and fractured L2-T9.





ALLERGIES: as per EMR. 





CHEMICAL DEPENDENCY HISTORY: as per HPI. 





FAMILY PSYCHIATRIC/SUBSTANCE USE HISTORY: States that her brother committed 

suicide in the 1970s.





SOCIAL HISTORY: Patient was born [place] raised in [place]  Patient claims 

that she completed high school and college and got a bachelor's in nursing.  She

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 

also ICU nurse.  She states that she retired about 3 years ago.  She denies any 

legal history.  She states that she lives with her  in a house, has 3 

kids..





MENTAL STATUS EXAM: 


General Appearance: Patient appears to be overweight, stated age is alert, 

irritable at times yet attempts to cooperate.  Patient appears to have fair 

hygiene and grooming wearing hospital gown with fair eye contact.


Behavior: Patient is calmly lying in bed without any agitated behavior.  Appears

to be in distress secondary to anxiety.


Speech: Patient's speech is fluent and nonpressured.  Irritable tone.


Mood/Affect: Patient reports their mood is "very anxious", affect is congruent


Suicidality/Homicidality:  Patient denies having any suicidal or homicidal 

ideation intent or plan.  


Perceptions: Patient denies any visual hallucinations and denies any auditory 

hallucinations  


Though content/process: There is no evidence of any delusional thought content 

and thought process is linear and goal-directed.  Rambles at times.  Focused on 

her symptoms and pain.


Memory and concentration: AOX3, grossly intact for the purposes of this session.

Can spell "WORLD" backwards


Judgment and insight: poor





IMPRESSIONS: 


Panic disorder





PLAN: 


-At this time patient DOES NOT meet criteria for inpatient psychiatric 

admission.


-Would recommend the following medication changes/additions: Melatonin 6 mg 

daily at bedtime for sleep, discontinued Lexapro and replaced with Cymbalta 30 

mg twice a day for mood/anxiety.  Increase BuSpar to 20 mg 3 times a day for 

anxiety.  Vistaril when necessary for anxiety, which should be the first line 

however if this fails then try Ativan prn for anxiety.


- to provide patient with outpatient mental health/psychiatry 

resources for appropriate follow up upon discharge


-Communicated plan to patient's nurse


-Will continue to follow along


-Please contact with any questions.











06/02/22 13:16

## 2022-06-03 LAB
GLUCOSE BLD-MCNC: 113 MG/DL (ref 75–99)
GLUCOSE BLD-MCNC: 114 MG/DL (ref 75–99)
GLUCOSE BLD-MCNC: 132 MG/DL (ref 75–99)
GLUCOSE BLD-MCNC: 142 MG/DL (ref 75–99)
GLUCOSE BLD-MCNC: 193 MG/DL (ref 75–99)

## 2022-06-03 RX ADMIN — METHYLPREDNISOLONE SODIUM SUCCINATE SCH MG: 40 INJECTION, POWDER, FOR SOLUTION INTRAMUSCULAR; INTRAVENOUS at 15:57

## 2022-06-03 RX ADMIN — INSULIN ASPART SCH UNIT: 100 INJECTION, SOLUTION INTRAVENOUS; SUBCUTANEOUS at 09:35

## 2022-06-03 RX ADMIN — ATORVASTATIN CALCIUM SCH MG: 10 TABLET, FILM COATED ORAL at 23:04

## 2022-06-03 RX ADMIN — APIXABAN SCH MG: 5 TABLET, FILM COATED ORAL at 23:03

## 2022-06-03 RX ADMIN — Medication SCH MG: at 09:40

## 2022-06-03 RX ADMIN — METHYLPREDNISOLONE SODIUM SUCCINATE SCH MG: 40 INJECTION, POWDER, FOR SOLUTION INTRAMUSCULAR; INTRAVENOUS at 09:32

## 2022-06-03 RX ADMIN — INSULIN ASPART SCH UNIT: 100 INJECTION, SOLUTION INTRAVENOUS; SUBCUTANEOUS at 17:54

## 2022-06-03 RX ADMIN — INSULIN ASPART SCH: 100 INJECTION, SOLUTION INTRAVENOUS; SUBCUTANEOUS at 23:01

## 2022-06-03 RX ADMIN — INSULIN ASPART SCH: 100 INJECTION, SOLUTION INTRAVENOUS; SUBCUTANEOUS at 12:44

## 2022-06-03 RX ADMIN — APIXABAN SCH MG: 5 TABLET, FILM COATED ORAL at 09:32

## 2022-06-03 RX ADMIN — METHYLPREDNISOLONE SODIUM SUCCINATE SCH MG: 40 INJECTION, POWDER, FOR SOLUTION INTRAMUSCULAR; INTRAVENOUS at 23:03

## 2022-06-03 RX ADMIN — Medication SCH MG: at 23:03

## 2022-06-03 RX ADMIN — OXYCODONE HYDROCHLORIDE AND ACETAMINOPHEN PRN EACH: 7.5; 325 TABLET ORAL at 09:40

## 2022-06-03 RX ADMIN — METOPROLOL TARTRATE SCH MG: 25 TABLET, FILM COATED ORAL at 23:04

## 2022-06-03 RX ADMIN — HYDROMORPHONE HYDROCHLORIDE PRN MG: 1 INJECTION, SOLUTION INTRAMUSCULAR; INTRAVENOUS; SUBCUTANEOUS at 23:04

## 2022-06-03 RX ADMIN — METOPROLOL TARTRATE SCH MG: 25 TABLET, FILM COATED ORAL at 09:32

## 2022-06-03 RX ADMIN — METHYLPREDNISOLONE SODIUM SUCCINATE SCH MG: 40 INJECTION, POWDER, FOR SOLUTION INTRAMUSCULAR; INTRAVENOUS at 19:01

## 2022-06-03 RX ADMIN — OXYCODONE HYDROCHLORIDE AND ACETAMINOPHEN PRN EACH: 7.5; 325 TABLET ORAL at 16:25

## 2022-06-03 NOTE — P.PN
Progress Note - Text


Progress Note Date: 06/03/22


Interval History:


Patient was seen for consult follow-up of anxiety. She was found sitting up in 

her chair by the window with her  at bedside. She reports her mood is 

"pretty good" today, reports she slept much better last night after the 

medication adjustments. She reports her anxiety as minimal today, rates it as 2-

3/10. She was having panic attacks about 1-2 times per day, but reports no panic

attacks so far today. She reports some improvement on the Vistaril. She reports 

feeling dizzy after taking the Cymbalta last night but that has subsided today. 

She denies any other medication side effects. At this time, patient denies any 

suicidal or homical ideations, intent or plan. Patient denies any auditory, 

visual hallucinations and denies any paranoia or delusions. 





Mental Status Exam:


General Appearance: Patient appears to be an overweight female, stated age. 

Patient appears to have fair hygiene and grooming, good eye contact.


Behavior: Patient is calmly sitting in her chair without any agitated behavior. 


Speech: Patient's speech is fluent and nonpressured.  


Mood/Affect: Patient reports her mood is "pretty good", affect is congruent


Suicidality/Homicidality: Patient denies having any suicidal or homicidal 

ideation intent or plan.  


Perceptions: Patient denies any visual hallucinations and denies any auditory 

hallucinations  


Though content/process: There is no evidence of any delusional thought content 

and thought process is linear and goal-directed. 


Memory and concentration: AOX3, grossly intact for the purposes of this session.

Can spell "WORLD" backwards


Judgment and insight: poor





Assessment


Panic disorder





Plan:


-At this time patient DOES NOT meet criteria for inpatient psychiatric 

admission.


-Would recommend the following medication changes/additions: 


Continue Melatonin 6 mg daily at bedtime for sleep


Continue Cymbalta 30 mg twice a day for mood/anxiety.  


Continue Buspar 20 mg 3 times a day for anxiety.  


Continue Vistaril when necessary for anxiety.


- to provide patient with outpatient mental health/psychiatry 

resources for appropriate follow up upon discharge


-Will sign off at this time. 


-Please contact with any questions.

## 2022-06-03 NOTE — P.PN
Subjective


Progress Note Date: 06/03/22











This is a 66-year-old female who was recently admitted with significant back 

pain and multiple fractures also experiencing significant panic attacks which is

a possible component of uncontrolled pain.  Patient is alert and oriented 3 and

denies any suicidal ideation or thoughts of wanting to harm herself or others.  

Psychiatry has been consulted and appreciate input and recommendations.  Patient

is currently maintained on IV pain medication along with oral and has Ativan as 

needed.  Patient denies chest pain or shortness of breath.  Patient is afebrile.

 Patient did receive her LSO brace and reports to walking out into the hallway 

with it.   at the bedside and questions and concerns were answered.





6/3/2022





Patient is seen today and appears more calm today and mildly sedated. Patient 

reports to being started on new medications and denies any real side effects at 

this time. Patient continues with severe panic attack and anxiety and will 

continue to monitor closely. Patient reports to being up with the brace and 

reports more right wrist and shoulder pain today than her back. Patient has not 

received IV pain medication since last night. Patient reports to minimal sleep 

at night. Patient is afebrile and denies chest pain or shortness of breath. 





Review of systems:


Constitutional:  reports of fatigue and extreme anxiety with frequent panic 

attacks, no reports of fever, or chills


Cardiovascular: No reports of chest pain or palpitations


Respiratory: No reports of shortness of breath or cough


GI: No reports of nausea, no reports of of vomiting 


: No reports of dysuria or retention


Neurovascular:  reports of generalized weakness, reports right wrist and right 

shoulder pain





All medications have been reviewed





Active Medications





Apixaban (Apixaban 5 Mg Tab)  5 mg PO BID [facility]; Protocol


   Last Admin: 06/03/22 09:32 Dose:  5 mg


   


Atorvastatin Calcium (Atorvastatin 10 Mg Tab)  10 mg PO Q48H [facility]


   Last Admin: 06/01/22 20:36 Dose:  10 mg


   


Buspirone HCl (Buspirone Hcl 10 Mg Tab)  20 mg PO TID [facility]


   Last Admin: 06/03/22 15:57 Dose:  20 mg


   


Duloxetine HCl (Duloxetine Hcl 30 Mg Capsule.Dr)  30 mg PO BID [facility]


   Last Admin: 06/03/22 09:33 Dose:  30 mg


   


Ferrous Sulfate (Ferrous Sulfate 325 Mg Tab)  325 mg PO W/ADALBERTO [facility]


   Last Admin: 06/03/22 09:40 Dose:  325 mg


   


Hydromorphone HCl (Hydromorphone 0.5 Mg/0.5 Ml Syringe)  0.5 mg IVP Q3H PRN


   PRN Reason: Severe Pain


   Last Admin: 06/02/22 22:10 Dose:  0.5 mg


   


Hydroxyzine Pamoate (Hydroxyzine Pamoate 25 Mg Cap)  50 mg PO Q8HR PRN


   PRN Reason: Anxiety


   Last Admin: 06/02/22 20:40 Dose:  50 mg


   


Insulin Aspart (Insulin Aspart (Novolog) 100 Unit/Ml Vial)  0 unit SQ ACHS [facility]; 

Protocol


   Last Admin: 06/03/22 17:54 Dose:  1 unit


   


Lorazepam (Lorazepam 2 Mg/Ml Inj)  1 mg IV Q6HR PRN


   PRN Reason: Anxiety


   Last Admin: 06/02/22 08:30 Dose:  1 mg


   


Lorazepam (Lorazepam 0.5 Mg Tab)  0.5 mg PO Q6HR PRN


   PRN Reason: Anxiety


   Last Admin: 06/01/22 19:59 Dose:  0.5 mg


   


Melatonin (Melatonin 3 Mg Tablet)  6 mg PO HS [facility]


   Last Admin: 06/02/22 22:12 Dose:  6 mg


   


Methocarbamol (Methocarbamol 500 Mg Tab)  1,000 mg PO TID@0900,1600,2100 [facility]


   Last Admin: 06/03/22 15:57 Dose:  1,000 mg


   


Methylprednisolone Sodium Succinate (Methylprednisolone Sod Succi 40 Mg/Ml 1 Ml 

Vial)  40 mg IV Q8HR [facility]


   Last Admin: 06/03/22 19:01 Dose:  40 mg


   


Metoprolol Tartrate (Metoprolol Tartrate 25 Mg Tab)  25 mg PO BID [facility]


   Last Admin: 06/03/22 09:32 Dose:  25 mg


   


Buprenorphine [ Butrans 5 Mcg/Hr] Patch  1 patch TRANSDERM TU [facility]


   Last Admin: 05/31/22 08:17 Dose:  Not Given


   


Oxycodone/Acetaminophen (Oxycodone-Apap 7.5-325mg 1 Each Tab)  1 each PO Q6HR 

PRN


   PRN Reason: Moderate Pain


   Last Admin: 06/03/22 16:25 Dose:  1 each


   


Polyethylene Glycol (Polyethylene Glycol 3350 17 Gm Powd.Pack)  17 gm PO DAILY 

[facility]


   Last Admin: 06/03/22 09:35 Dose:  17 gm


   











PHYSICAL EXAMINATION: 





GENERAL: The patient is alert and oriented x4,  Well developed, well nourished. 

Anxious


HEENT: Pupils are round and equally reacting to light. EOMI. no scleral icterus.

No conjunctival pallor. Normocephalic, atraumatic. No pharyngeal erythema. No 

thyromegaly.  


CARDIOVASCULAR: S1 and S2  muffled 


PULMONARY: diminished breath sounds bilaterally with no wheezing or rhonchi 

noted. 


ABDOMEN: soft.  Nontender on exam.  obese. non-distended, normoactive bowel 

sounds. No palpable organomegaly. 


MUSCULOSKELETAL: No joint swelling or deformity.


EXTREMITIES: No cyanosis, clubbing, or pedal edema.  


NEUROLOGICAL: Gross neurological examination did not reveal any focal deficits. 

Diffuse weakness


SKIN: No rashes.  Right shoulder surgical site is dry and intact with some 

Steri-Strips noted, right wrist spint noted





Assessment:





Acute severe back pain, intractable, with multiple compression fractures


History of recent right shoulder surgery and elbow and right forearm surgery


Recent pulmonary embolism, anticoagulated with Eliquis


Anxiety, severe, and panic attacks


Hyperlipidemia


Gastroesophageal reflux disease


Chronic anemia


GI prophylaxis


DVT prophylaxis


Full code





Plan:





Recommend to continue with current medications and management of pain.  

Orthopedics Dr. Ingram has evaluated the patient recommending conservative 

management at this time and LSO brace has been delivered and patient reports to 

using and able to get up with assistance of her  minimally to the 

hallway.  Patient continues with extreme and severe anxiety and panic attacks 

and psychiatry has evaluated the patient recommending medication adjustments.  

Will continue to monitor closely and evaluate for improvement of panic attacks, 

anxiety, and pain management.  Recommend continue monitoring Accu-Cheks before 

meals and at bedtime and continue with sliding scale as needed as patient is on 

IV steroids.  Patient will complete a steroid taper on discharge.  Continue to 

encourage incentive spirometer and  use at least 10 times every hour while 

awake.Strongly encouraged the patient to get up out of bed more frequently and 

walk multiple times per day.   Due to multiple complex medical issues, prognosis

is guarded.








The impression and plan of care has been dictated by Marine Cardenas, nurse 

practitioner as directed.





MD Margie


I have performed a history and examination and MDM of this patient, discussed 

the same with the dictator, and  agree with the dictator's assessment and plan 

as written ,documented as a scribe. Based on total visit time,  I have performed

more than 50% of the visit. Any additional findings or plans will be noted. 





Objective





- Vital Signs


Vital signs: 


                                   Vital Signs











Temp  97.5 F L  06/03/22 07:00


 


Pulse  67   06/03/22 07:00


 


Resp  18   06/03/22 07:00


 


BP  152/96   06/03/22 07:00


 


Pulse Ox  98   06/03/22 07:00


 


FiO2      








                                 Intake & Output











 06/02/22 06/03/22 06/03/22





 18:59 06:59 18:59


 


Intake Total 336  118


 


Balance 336  118


 


Intake:   


 


  Oral 336  118


 


Other:   


 


  Voiding Method Bedside Commode  


 


  # Voids 4 1 














- Labs


CBC & Chem 7: 


                                 05/30/22 05:43





                                 05/30/22 05:43


Labs: 


                  Abnormal Lab Results - Last 24 Hours (Table)











  06/02/22 06/02/22 06/02/22 Range/Units





  11:51 17:05 20:24 


 


POC Glucose (mg/dL)  123 H  126 H  130 H  (75-99)  mg/dL














  06/03/22 Range/Units





  07:37 


 


POC Glucose (mg/dL)  142 H  (75-99)  mg/dL

## 2022-06-04 LAB
GLUCOSE BLD-MCNC: 101 MG/DL (ref 75–99)
GLUCOSE BLD-MCNC: 113 MG/DL (ref 75–99)
GLUCOSE BLD-MCNC: 123 MG/DL (ref 75–99)
GLUCOSE BLD-MCNC: 153 MG/DL (ref 75–99)

## 2022-06-04 RX ADMIN — OXYCODONE HYDROCHLORIDE AND ACETAMINOPHEN PRN EACH: 7.5; 325 TABLET ORAL at 14:18

## 2022-06-04 RX ADMIN — METOPROLOL TARTRATE SCH MG: 25 TABLET, FILM COATED ORAL at 20:19

## 2022-06-04 RX ADMIN — APIXABAN SCH MG: 5 TABLET, FILM COATED ORAL at 08:40

## 2022-06-04 RX ADMIN — METHYLPREDNISOLONE SODIUM SUCCINATE SCH MG: 40 INJECTION, POWDER, FOR SOLUTION INTRAMUSCULAR; INTRAVENOUS at 08:40

## 2022-06-04 RX ADMIN — METOPROLOL TARTRATE SCH MG: 25 TABLET, FILM COATED ORAL at 08:39

## 2022-06-04 RX ADMIN — INSULIN ASPART SCH: 100 INJECTION, SOLUTION INTRAVENOUS; SUBCUTANEOUS at 17:32

## 2022-06-04 RX ADMIN — INSULIN ASPART SCH UNIT: 100 INJECTION, SOLUTION INTRAVENOUS; SUBCUTANEOUS at 20:19

## 2022-06-04 RX ADMIN — Medication SCH MG: at 08:39

## 2022-06-04 RX ADMIN — OXYCODONE HYDROCHLORIDE AND ACETAMINOPHEN PRN EACH: 7.5; 325 TABLET ORAL at 08:39

## 2022-06-04 RX ADMIN — INSULIN ASPART SCH: 100 INJECTION, SOLUTION INTRAVENOUS; SUBCUTANEOUS at 12:19

## 2022-06-04 RX ADMIN — INSULIN ASPART SCH: 100 INJECTION, SOLUTION INTRAVENOUS; SUBCUTANEOUS at 08:33

## 2022-06-04 RX ADMIN — APIXABAN SCH MG: 5 TABLET, FILM COATED ORAL at 20:19

## 2022-06-04 RX ADMIN — Medication SCH MG: at 20:18

## 2022-06-04 RX ADMIN — METHYLPREDNISOLONE SODIUM SUCCINATE SCH MG: 40 INJECTION, POWDER, FOR SOLUTION INTRAMUSCULAR; INTRAVENOUS at 16:30

## 2022-06-04 NOTE — PN
PROGRESS NOTE



DATE OF SERVICE:

06/04/2022



This 66-year-old woman who was admitted with severe back pain is being closely

monitored. Patient has multiple medical issues. Patient has anxiety also. Cymbalta and

other medication are being adjusted.  No chest pain.  No palpitations.  No fever.



PHYSICAL EXAMINATION:

Pulse 47, blood pressure 163/83, respiration 14.

HEENT: Conjunctivae normal.

NECK: No jugular venous distention.

CARDIOVASCULAR: S1, S2 muffled.

RESPIRATION: Breath sounds diminished at the bases.  Scattered rhonchi.

ABDOMEN: Soft.

NERVOUS SYSTEM: No focal deficit.



LABS:

Accu-Cheks noted.



ASSESSMENT:

1. Acute severe back pain, intractable, with multiple compression fractures.

2. History of recent right shoulder surgery with elbow and right forearm surgery.

3. Anxiety.

4. History of recent pulmonary embolism, anticoagulated with Eliquis.

5. Anxiety.

6. Hyperlipidemia.

7. Gastroesophageal reflux disease.

8. Multiple medical issues.



RECOMMENDATIONS AND DISCUSSION:

I recommend to continue current medications, continue with the monitoring, symptomatic

treatment.  Continue with pain management.  Increase ambulation.  I would recommend

repeat labs in the morning. Guarded prognosis.  Closely follow with multiple

consultants.  Further recommendations to follow.





MMODL / IJN: [id number] / Job#: [id number]

## 2022-06-05 LAB
ALBUMIN SERPL-MCNC: 3.9 G/DL (ref 3.5–5)
ALBUMIN/GLOB SERPL: 1.2 {RATIO}
ALP SERPL-CCNC: 178 U/L (ref 38–126)
ALT SERPL-CCNC: 30 U/L (ref 4–34)
ANION GAP SERPL CALC-SCNC: 7 MMOL/L
AST SERPL-CCNC: 32 U/L (ref 14–36)
BASOPHILS # BLD AUTO: 0 K/UL (ref 0–0.2)
BASOPHILS NFR BLD AUTO: 0 %
BUN SERPL-SCNC: 18 MG/DL (ref 7–17)
CALCIUM SPEC-MCNC: 9.3 MG/DL (ref 8.4–10.2)
CHLORIDE SERPL-SCNC: 102 MMOL/L (ref 98–107)
CO2 SERPL-SCNC: 25 MMOL/L (ref 22–30)
EOSINOPHIL # BLD AUTO: 0 K/UL (ref 0–0.7)
EOSINOPHIL NFR BLD AUTO: 0 %
ERYTHROCYTE [DISTWIDTH] IN BLOOD BY AUTOMATED COUNT: 4 M/UL (ref 3.8–5.4)
ERYTHROCYTE [DISTWIDTH] IN BLOOD: 14.5 % (ref 11.5–15.5)
GLOBULIN SER CALC-MCNC: 3.3 G/DL
GLUCOSE BLD-MCNC: 117 MG/DL (ref 75–99)
GLUCOSE BLD-MCNC: 120 MG/DL (ref 75–99)
GLUCOSE BLD-MCNC: 124 MG/DL (ref 75–99)
GLUCOSE BLD-MCNC: 140 MG/DL (ref 75–99)
GLUCOSE SERPL-MCNC: 124 MG/DL (ref 74–99)
HCT VFR BLD AUTO: 38.5 % (ref 34–46)
HGB BLD-MCNC: 12.3 GM/DL (ref 11.4–16)
LYMPHOCYTES # SPEC AUTO: 0.8 K/UL (ref 1–4.8)
LYMPHOCYTES NFR SPEC AUTO: 8 %
MCH RBC QN AUTO: 30.8 PG (ref 25–35)
MCHC RBC AUTO-ENTMCNC: 32 G/DL (ref 31–37)
MCV RBC AUTO: 96.3 FL (ref 80–100)
MONOCYTES # BLD AUTO: 0.5 K/UL (ref 0–1)
MONOCYTES NFR BLD AUTO: 5 %
NEUTROPHILS # BLD AUTO: 8.5 K/UL (ref 1.3–7.7)
NEUTROPHILS NFR BLD AUTO: 86 %
PLATELET # BLD AUTO: 347 K/UL (ref 150–450)
POTASSIUM SERPL-SCNC: 4.8 MMOL/L (ref 3.5–5.1)
PROT SERPL-MCNC: 7.2 G/DL (ref 6.3–8.2)
SODIUM SERPL-SCNC: 134 MMOL/L (ref 137–145)
WBC # BLD AUTO: 9.9 K/UL (ref 3.8–10.6)

## 2022-06-05 RX ADMIN — INSULIN ASPART SCH: 100 INJECTION, SOLUTION INTRAVENOUS; SUBCUTANEOUS at 07:33

## 2022-06-05 RX ADMIN — APIXABAN SCH MG: 5 TABLET, FILM COATED ORAL at 08:42

## 2022-06-05 RX ADMIN — Medication SCH MG: at 08:43

## 2022-06-05 RX ADMIN — APIXABAN SCH MG: 5 TABLET, FILM COATED ORAL at 21:09

## 2022-06-05 RX ADMIN — METHYLPREDNISOLONE SODIUM SUCCINATE SCH MG: 40 INJECTION, POWDER, FOR SOLUTION INTRAMUSCULAR; INTRAVENOUS at 00:20

## 2022-06-05 RX ADMIN — OXYCODONE HYDROCHLORIDE AND ACETAMINOPHEN PRN EACH: 7.5; 325 TABLET ORAL at 08:42

## 2022-06-05 RX ADMIN — OXYCODONE HYDROCHLORIDE AND ACETAMINOPHEN PRN EACH: 7.5; 325 TABLET ORAL at 17:34

## 2022-06-05 RX ADMIN — METHYLPREDNISOLONE SODIUM SUCCINATE SCH MG: 40 INJECTION, POWDER, FOR SOLUTION INTRAMUSCULAR; INTRAVENOUS at 08:43

## 2022-06-05 RX ADMIN — METOPROLOL TARTRATE SCH MG: 25 TABLET, FILM COATED ORAL at 21:10

## 2022-06-05 RX ADMIN — METOPROLOL TARTRATE SCH MG: 25 TABLET, FILM COATED ORAL at 08:43

## 2022-06-05 RX ADMIN — INSULIN ASPART SCH: 100 INJECTION, SOLUTION INTRAVENOUS; SUBCUTANEOUS at 12:18

## 2022-06-05 RX ADMIN — INSULIN ASPART SCH: 100 INJECTION, SOLUTION INTRAVENOUS; SUBCUTANEOUS at 21:17

## 2022-06-05 RX ADMIN — ATORVASTATIN CALCIUM SCH MG: 10 TABLET, FILM COATED ORAL at 21:10

## 2022-06-05 RX ADMIN — HYDROMORPHONE HYDROCHLORIDE PRN MG: 1 INJECTION, SOLUTION INTRAMUSCULAR; INTRAVENOUS; SUBCUTANEOUS at 02:20

## 2022-06-05 RX ADMIN — METHYLPREDNISOLONE SODIUM SUCCINATE SCH MG: 40 INJECTION, POWDER, FOR SOLUTION INTRAMUSCULAR; INTRAVENOUS at 16:04

## 2022-06-05 RX ADMIN — INSULIN ASPART SCH: 100 INJECTION, SOLUTION INTRAVENOUS; SUBCUTANEOUS at 17:33

## 2022-06-05 NOTE — PN
PROGRESS NOTE



DATE OF SERVICE:

06/05/2022



This 66-year-old woman who was admitted with acute severe back pain, is on conservative

line of management.  No chest pain.  No palpitations.  No fever.



EXAM:

Alert and oriented times three.  Pulse 55, blood pressure 150/82, respirations 18.

HEENT: Conjunctivae normal. Neck: No JVD. Cardiovascular: S1, S2 muffled. Respirations:

Breath sounds diminished in the bases. No rhonchi. No crackles.  Abdomen soft.  Nervous

system: No focal deficits.



LABS:

Noted. Accu-Cheks 126.



ASSESSMENT:

1. Acute severe back pain, intractable with multiple compression fractures.

2. History of recent right shoulder surgery with elbow and right forearm surgery.

3. Anxiety.

4. History of recent pulmonary embolism, anticoagulated with Eliquis.

5. Anxiety.

6. Hyperlipidemia.

7. Gastroesophageal reflux disease.

8. Multiple medical issues.



RECOMMENDATIONS AND DISCUSSION:

Recommend to continue current medications, management and symptomatic treatment.

Continue steroids.  Otherwise pain management.  Case Management to check the cost of

Eliquis versus Xarelto.  Guarded prognosis.  Dr. Portillo will follow tomorrow.





MMNAMITAL / IJN: [id number] / Job#: [id number]

## 2022-06-06 VITALS — RESPIRATION RATE: 18 BRPM

## 2022-06-06 VITALS — TEMPERATURE: 98 F | DIASTOLIC BLOOD PRESSURE: 68 MMHG | SYSTOLIC BLOOD PRESSURE: 120 MMHG | HEART RATE: 54 BPM

## 2022-06-06 LAB
GLUCOSE BLD-MCNC: 119 MG/DL (ref 75–99)
GLUCOSE BLD-MCNC: 97 MG/DL (ref 75–99)

## 2022-06-06 RX ADMIN — INSULIN ASPART SCH: 100 INJECTION, SOLUTION INTRAVENOUS; SUBCUTANEOUS at 13:10

## 2022-06-06 RX ADMIN — METOPROLOL TARTRATE SCH MG: 25 TABLET, FILM COATED ORAL at 08:25

## 2022-06-06 RX ADMIN — METHYLPREDNISOLONE SODIUM SUCCINATE SCH MG: 40 INJECTION, POWDER, FOR SOLUTION INTRAMUSCULAR; INTRAVENOUS at 00:32

## 2022-06-06 RX ADMIN — Medication SCH MG: at 08:25

## 2022-06-06 RX ADMIN — APIXABAN SCH MG: 5 TABLET, FILM COATED ORAL at 08:24

## 2022-06-06 RX ADMIN — Medication SCH MG: at 00:32

## 2022-06-06 RX ADMIN — INSULIN ASPART SCH: 100 INJECTION, SOLUTION INTRAVENOUS; SUBCUTANEOUS at 08:25

## 2022-06-06 RX ADMIN — OXYCODONE HYDROCHLORIDE AND ACETAMINOPHEN PRN EACH: 7.5; 325 TABLET ORAL at 08:22

## 2022-06-06 RX ADMIN — OXYCODONE HYDROCHLORIDE AND ACETAMINOPHEN PRN EACH: 7.5; 325 TABLET ORAL at 00:32

## 2022-06-06 RX ADMIN — METHYLPREDNISOLONE SODIUM SUCCINATE SCH: 40 INJECTION, POWDER, FOR SOLUTION INTRAMUSCULAR; INTRAVENOUS at 11:46

## 2022-06-06 RX ADMIN — OXYCODONE HYDROCHLORIDE AND ACETAMINOPHEN PRN EACH: 7.5; 325 TABLET ORAL at 14:32

## 2022-06-10 NOTE — CDI
Documentation Clarification Form



Date: 06/10/2022 01:48:38 PM

From: Pattie Boyle RN CCDS

Phone: [phone]

MRN: [id number]

Admit Date: 05/31/2022 11:07:00 AM

Patient Name: Michaela Wilkins

Visit Number: [id number]

Discharge Date:  06/06/2022 03:00:00 PM





ATTENTION: The Clinical Documentation Specialists (CDI) and [facility] Coding Staff 
appreciate your assistance in clarifying documentation. Please respond to the 
clarification below the line at the bottom and electronically sign. The CDI & 
[facility] Coding staff will review the response and follow-up if needed. Please note: 
Queries are made part of the Legal Health Record. If you have any questions, 
please contact the author of this message via ITS.



Dr. Rafael Portillo



There is documentation of recent pulmonary embolism, 6/6, DCS.  Additional 
clarification of the acuity of the condition is requested.





History/Risk Factors: 66-year-old female presents to the ED with worsening back 
pain after returning to [place] after being in the hospital 4/25/2022 in 
[place], [place]. Medical History: PE, Compression fractures T9 and L2; 
Multiple  fractures to shoulder, elbow and wrist which required surgical 
intervention in [place]. 6/21, Ortho progress note. 





Clinical Indicators: 

 5/31, Ortho PN: Patient is also currently on Elquis for recent diagnosis of 
pulmonary embolism during her admission in [place], [place].

6/1, CXR: Bilateral infiltrate and small effusion. 

6/1, Venous doppler: Bilateral lower extremity ultrasound negative for DVT. 



Treatment: 5/29 6/6 Eliquis 5mg PO BID



Can you please clarify  the acuity of the Pulmonary Embolsim? 



[ X ] Acute Pulmonary Embolism

[  ] Subacute Pulmonary Embolism

[  ] Chronic Pulmonary Embolism

[  ] Other, please specify ________

[  ] Unable to determine





(Template Last Revised: March 2021)









MTDD